# Patient Record
Sex: MALE | Race: WHITE | Employment: OTHER | URBAN - METROPOLITAN AREA
[De-identification: names, ages, dates, MRNs, and addresses within clinical notes are randomized per-mention and may not be internally consistent; named-entity substitution may affect disease eponyms.]

---

## 2017-05-25 ENCOUNTER — PREPPED CHART (OUTPATIENT)
Dept: URBAN - METROPOLITAN AREA CLINIC 35 | Facility: CLINIC | Age: 72
End: 2017-05-25

## 2017-09-18 NOTE — PROCEDURE NOTE: CLINICAL
PROCEDURE NOTE: Avastin () #1 OS. Diagnosis: Diabetes, Type II with Ocular Complications. Prep: Betadine Flush. Prior to the original injection, risks/benefits/alternatives discussed including infection, loss of vision, hemorrhage, cataract, glaucoma, retinal tears or detachment. A written consent is on file, and the need for today’s injection was discussed and the patient is understanding and wishes to proceed. The off-label status of Intravitreal Avastin also was reviewed. The patient wished to proceed with treatment. The patient wished to proceed with treatment. Topical anesthetic drops were applied to the eye. Betadine prep was performed. Surgical mask worn. Sterile drape and lid speculum were applied. Using the syringe provided, Avastin 1.25 mg in 0.05 cc was injected into the vitreous cavity. Injection site: 3-4 mm from the limbus. Patient tolerated procedure well. Following the intravitreal injection, the sterile lid speculum was removed. CRA perfusion confirmed. CF vision checked. Patient given office phone number/answering service number and advised to call immediately should there be an increase in floaters or redness, loss of vision or pain, or should they have any other questions or concerns. Riaz Brown

## 2017-10-16 NOTE — PATIENT DISCUSSION
Still some edema, rec Avastin #2 today.  this is a chronic disease and likely will require future tx's.

## 2017-10-16 NOTE — PATIENT DISCUSSION
Patient understands condition, prognosis and need for follow up care.  At this point not significantly active, can become active in future requiring additional tx.  S/S edu.

## 2017-12-18 NOTE — PATIENT DISCUSSION
IOP still high OU today.  rec cont Cosopt BUT use OU.  Add Latanoprost OHS OU.  pt okay to wait to get Latanoprost until Jan 1st due to being in medicare donut hole. STRESSED COMPLIANCE.

## 2018-01-22 NOTE — PATIENT DISCUSSION
Advised to call immediately if eye pain or loss of vision.
Discussed the importance of blood sugar control in the prevention of ocular complications.
Discussed the importance of blood sugar control in the prevention of ocular complications. Discussed this is a chronic disease.
Discussed with the patient the importance of good control of their blood sugar, blood pressure, cholesterol, diet, exercise, weight, and medication usage under the guidance of their diabetic doctor to prevent/halt diabetic retinopathy.
Doing well.
Edema seen on OCT and exam today. Series of 2 x 6 weeks apart.
No retinal detachment or retinal tear noted.
No tx rec at this time.
Patient understands condition, prognosis and need for follow up care.
Pt instilled last drop of Cosopt this a.m.  Gave samples of PF Cosopt to hold over for Dr. Jessy Kelly appt on 1/26/18.
Recommended observation.
Recommend  Avastin #3 (1 of 2) injection today.  Injection was administered without complication.  Post-injection instructions were reviewed and understood by the pt.
Retinal detachment warnings given.
See #1.
Use artificial tears to affected eye(s) as needed.
daily vision checks with amsler grid. continued AREDS2.
not active today.
no fever and no chills.

## 2018-01-22 NOTE — PROCEDURE NOTE: CLINICAL
PROCEDURE NOTE: Avastin () (1of 2) #3 OS. Diagnosis: Moderate Nonproliferative Diabetic Retinopathy. Prep: Betadine Drops and Betadine Scrub. Prior to the original injection, risks/benefits/alternatives discussed including infection, loss of vision, hemorrhage, cataract, glaucoma, retinal tears or detachment. A written consent is on file, and the need for today’s injection was discussed and the patient is understanding and wishes to proceed. The off-label status of Intravitreal Avastin also was reviewed. The patient wished to proceed with treatment. The patient wished to proceed with treatment. Topical anesthetic drops were applied to the eye. Betadine prep was performed. Surgical mask worn. Sterile drape and lid speculum were applied. Using the syringe provided, Avastin 1.25 mg in 0.05 cc was injected into the vitreous cavity. Injection site: 3-4 mm from the limbus. Patient tolerated procedure well. Following the intravitreal injection, the sterile lid speculum was removed. CRA perfusion confirmed. CF vision checked. Patient given office phone number/answering service number and advised to call immediately should there be an increase in floaters or redness, loss of vision or pain, or should they have any other questions or concerns. Isael Sylvester

## 2018-01-26 NOTE — PATIENT DISCUSSION
Discussed the importance of blood sugar control in the prevention of ocular complications. Discussed this is a chronic disease.

## 2018-01-26 NOTE — PATIENT DISCUSSION
Recommend  Avastin #3 (1 of 2) injection today.  Injection was administered without complication.  Post-injection instructions were reviewed and understood by the pt.

## 2018-03-12 NOTE — PROCEDURE NOTE: CLINICAL
PROCEDURE NOTE: Avastin () ( 2 Of 2) #4 OS. Diagnosis: Moderate Nonproliferative Diabetic Retinopathy. Prior to the original injection, risks/benefits/alternatives discussed including infection, loss of vision, hemorrhage, cataract, glaucoma, retinal tears or detachment. A written consent is on file, and the need for today’s injection was discussed and the patient is understanding and wishes to proceed. The off-label status of Intravitreal Avastin also was reviewed. The patient wished to proceed with treatment. The patient wished to proceed with treatment. Topical anesthetic drops were applied to the eye. Betadine prep was performed. Surgical mask worn. Sterile drape and lid speculum were applied. Using the syringe provided, Avastin 1.25 mg in 0.05 cc was injected into the vitreous cavity. Injection site: 3-4 mm from the limbus. Patient tolerated procedure well. Following the intravitreal injection, the sterile lid speculum was removed. CRA perfusion confirmed. CF vision checked. Patient given office phone number/answering service number and advised to call immediately should there be an increase in floaters or redness, loss of vision or pain, or should they have any other questions or concerns. Fabby Rene

## 2018-03-12 NOTE — PATIENT DISCUSSION
Pt here for Avastin #4 (2 of 2) injection today.  Injection was administered without complication.  Post-injection instructions were reviewed and understood by the pt.

## 2018-04-16 NOTE — PATIENT DISCUSSION
Pt has cost issues with current glc gtts.  Pt was given RX in the past by [facility] due to nationwide shortage from [employer] but advised all glc gtts should now go through [facility].  [address] team notified.

## 2018-04-16 NOTE — PROCEDURE NOTE: CLINICAL
PROCEDURE NOTE: Avastin () #5 OS. Diagnosis: Moderate Nonproliferative Diabetic Retinopathy. Prior to the original injection, risks/benefits/alternatives discussed including infection, loss of vision, hemorrhage, cataract, glaucoma, retinal tears or detachment. A written consent is on file, and the need for today’s injection was discussed and the patient is understanding and wishes to proceed. The off-label status of Intravitreal Avastin also was reviewed. The patient wished to proceed with treatment. The patient wished to proceed with treatment. Topical anesthetic drops were applied to the eye. Betadine prep was performed. Surgical mask worn. Sterile drape and lid speculum were applied. Using the syringe provided, Avastin 1.25 mg in 0.05 cc was injected into the vitreous cavity. Injection site: 3-4 mm from the limbus. Patient tolerated procedure well. Following the intravitreal injection, the sterile lid speculum was removed. CRA perfusion confirmed. CF vision checked. Patient given office phone number/answering service number and advised to call immediately should there be an increase in floaters or redness, loss of vision or pain, or should they have any other questions or concerns. Toi Paulino

## 2018-06-04 NOTE — PATIENT DISCUSSION
Pt edu no DME seen on exam or OCT today. Recommend watching for now. May need treatment in future if worsens. S/s of worsening discussed, advised to call with any vision changes.

## 2018-08-06 NOTE — PATIENT DISCUSSION
Pt edu edema worse and vision is worse.  Recommend treatment of Avastin today. Pt elects to proceed. Will follow up in 1 month and will possibly switch to Ozurdex.

## 2018-08-06 NOTE — PROCEDURE NOTE: CLINICAL
PROCEDURE NOTE: Avastin () #6 OS. Diagnosis: Moderate Nonproliferative Diabetic Retinopathy. Prior to the original injection, risks/benefits/alternatives discussed including infection, loss of vision, hemorrhage, cataract, glaucoma, retinal tears or detachment. A written consent is on file, and the need for today’s injection was discussed and the patient is understanding and wishes to proceed. The off-label status of Intravitreal Avastin also was reviewed. The patient wished to proceed with treatment. The patient wished to proceed with treatment. Topical anesthetic drops were applied to the eye. Betadine prep was performed. Surgical mask worn. Sterile drape and lid speculum were applied. Using the syringe provided, Avastin 1.25 mg in 0.05 cc was injected into the vitreous cavity. Injection site: 3-4 mm from the limbus. Patient tolerated procedure well. Following the intravitreal injection, the sterile lid speculum was removed. CRA perfusion confirmed. CF vision checked. Patient given office phone number/answering service number and advised to call immediately should there be an increase in floaters or redness, loss of vision or pain, or should they have any other questions or concerns. Rickey Bravo

## 2018-08-31 NOTE — PATIENT DISCUSSION
**Pt called when she got home, she has dorzolamide, not dorzolamide/timolol. Told pt to use Dorzolamide in OS BID, Timolol ou bid, and latanoprost ou qhs** 08/31/18 An.

## 2018-09-10 NOTE — PATIENT DISCUSSION
Pt edu edema is improving and vision is improving.  Recommend treatment of Avastin today. Pt elects to proceed.

## 2018-09-10 NOTE — PROCEDURE NOTE: CLINICAL
PROCEDURE NOTE: Avastin ()(1 of 2) #7 OS. Diagnosis: Moderate Nonproliferative Diabetic Retinopathy. Prior to the original injection, risks/benefits/alternatives discussed including infection, loss of vision, hemorrhage, cataract, glaucoma, retinal tears or detachment. A written consent is on file, and the need for today’s injection was discussed and the patient is understanding and wishes to proceed. The off-label status of Intravitreal Avastin also was reviewed. The patient wished to proceed with treatment. The patient wished to proceed with treatment. Topical anesthetic drops were applied to the eye. Betadine prep was performed. Surgical mask worn. Sterile drape and lid speculum were applied. Using the syringe provided, Avastin 1.25 mg in 0.05 cc was injected into the vitreous cavity. Injection site: 3-4 mm from the limbus. Patient tolerated procedure well. Following the intravitreal injection, the sterile lid speculum was removed. CRA perfusion confirmed. CF vision checked. Patient given office phone number/answering service number and advised to call immediately should there be an increase in floaters or redness, loss of vision or pain, or should they have any other questions or concerns. Rhonda Luis

## 2018-09-10 NOTE — PATIENT DISCUSSION
Injection of Avastin # 7 (1 of 2) given today without complication.  Post-injection instructions were reviewed and understood by pt.  Series of 2 x 5 weeks apart.

## 2018-09-10 NOTE — PATIENT DISCUSSION
AMD remains persistent but without progression. Continue with Amsler grid and AREDS 2. Avoid direct or indirect smoking.

## 2018-10-15 NOTE — PROCEDURE NOTE: CLINICAL
PROCEDURE NOTE: Avastin () (2 of 2) #8 OS. Diagnosis: Moderate Nonproliferative Diabetic Retinopathy. Prior to the original injection, risks/benefits/alternatives discussed including infection, loss of vision, hemorrhage, cataract, glaucoma, retinal tears or detachment. A written consent is on file, and the need for today’s injection was discussed and the patient is understanding and wishes to proceed. The off-label status of Intravitreal Avastin also was reviewed. The patient wished to proceed with treatment. The patient wished to proceed with treatment. Topical anesthetic drops were applied to the eye. Betadine prep was performed. Surgical mask worn. Sterile drape and lid speculum were applied. Using the syringe provided, Avastin 1.25 mg in 0.05 cc was injected into the vitreous cavity. Injection site: 3-4 mm from the limbus. Patient tolerated procedure well. Following the intravitreal injection, the sterile lid speculum was removed. CRA perfusion confirmed. CF vision checked. Patient given office phone number/answering service number and advised to call immediately should there be an increase in floaters or redness, loss of vision or pain, or should they have any other questions or concerns. Kate Arauz

## 2018-10-15 NOTE — PATIENT DISCUSSION
Injection of Avastin # 8 (2 of 2) given today without complication.  Post-injection instructions were reviewed and understood by pt.

## 2018-11-26 NOTE — PROCEDURE NOTE: CLINICAL
PROCEDURE NOTE: Avastin ()(1 of 2) #9 OS. Diagnosis: Moderate Nonproliferative Diabetic Retinopathy. Prior to the original injection, risks/benefits/alternatives discussed including infection, loss of vision, hemorrhage, cataract, glaucoma, retinal tears or detachment. A written consent is on file, and the need for today’s injection was discussed and the patient is understanding and wishes to proceed. The off-label status of Intravitreal Avastin also was reviewed. The patient wished to proceed with treatment. The patient wished to proceed with treatment. Topical anesthetic drops were applied to the eye. Betadine prep was performed. Surgical mask worn. Sterile drape and lid speculum were applied. Using the syringe provided, Avastin 1.25 mg in 0.05 cc was injected into the vitreous cavity. Injection site: 3-4 mm from the limbus. Patient tolerated procedure well. Following the intravitreal injection, the sterile lid speculum was removed. CRA perfusion confirmed. CF vision checked. Patient given office phone number/answering service number and advised to call immediately should there be an increase in floaters or redness, loss of vision or pain, or should they have any other questions or concerns. Sandy Gamez

## 2019-01-07 NOTE — PATIENT DISCUSSION
Avastin # 10 (2 of 2) given without complication. Post injection instructions given to patient. Symptoms of retinal detachment and endophthalmitis following intravitreal injection discussed; patient advised to call immediately if symptoms ensue.

## 2019-01-07 NOTE — PROCEDURE NOTE: CLINICAL
PROCEDURE NOTE: Avastin () ( 2 of 2) #10 OS. Diagnosis: Moderate Nonproliferative Diabetic Retinopathy. Prep: Betadine Drops and Betadine Scrub. Prior to the original injection, risks/benefits/alternatives discussed including infection, loss of vision, hemorrhage, cataract, glaucoma, retinal tears or detachment. A written consent is on file, and the need for today’s injection was discussed and the patient is understanding and wishes to proceed. The off-label status of Intravitreal Avastin also was reviewed. The patient wished to proceed with treatment. The patient wished to proceed with treatment. Topical anesthetic drops were applied to the eye. Betadine prep was performed. Surgical mask worn. Sterile drape and lid speculum were applied. Using the syringe provided, Avastin 1.25 mg in 0.05 cc was injected into the vitreous cavity. Injection site: 3-4 mm from the limbus. Patient tolerated procedure well. Following the intravitreal injection, the sterile lid speculum was removed. CRA perfusion confirmed. CF vision checked. Patient given office phone number/answering service number and advised to call immediately should there be an increase in floaters or redness, loss of vision or pain, or should they have any other questions or concerns. Deidra Anderson

## 2019-03-04 NOTE — PROCEDURE NOTE: CLINICAL
PROCEDURE NOTE: Eylea 2mg (1 of 2) #1 OS. Diagnosis: Diabetic Macular Edema. Prior to injection, risks/benefits/alternatives discussed including corneal abrasion, infection, loss of vision, hemorrhage, cataract, glaucoma, retinal tears or detachment. A written consent is on file, and the need for today’s injection was discussed and the patient is understanding and wishes to proceed. The entire vial of Eylea was drawn up into a syringe. The opened vial, remaining drug, and filtered needle were disposed of in a certified biohazard container. Betadine prep was performed. Topical anesthesia was induced with Alcaine. 4% lidocaine pledge. A lid speculum was used. A short 30g needle on a 1cc syringe was used with product that that had previously been prepared under sterile conditions. Injection site: 3-4 mm from the limbus. The used syringe/needle was transferred to a biohazard container. Lid speculum removed. Mask worn during procedure. Patient tolerated procedure well. Count fingers vision was verified. There were no complications. Patient was given the standard instruction sheet. Patient given office phone number/answering service number and advised to call immediately should there be loss of vision or pain, or should they have any other questions or concerns. Haven Newell

## 2019-03-04 NOTE — PATIENT DISCUSSION
Pt edu DME worse. Very long detailed discussion with patient regarding [facility] [employer] of Eylea. Pt states that she read in some magazine that Eylea was bad for Diabetics and risk of infection. Stressed to patient there are risks of infection with ALL injections. Recommend a series of 2 Eylea, 4 weeks apart. Pt elects to proceed.

## 2019-04-01 NOTE — PROCEDURE NOTE: CLINICAL
PROCEDURE NOTE: Eylea 2mg ( 2 of 2) #2 OS. Diagnosis: Moderate Nonproliferative Diabetic Retinopathy. Prior to injection, risks/benefits/alternatives discussed including corneal abrasion, infection, loss of vision, hemorrhage, cataract, glaucoma, retinal tears or detachment. A written consent is on file, and the need for today’s injection was discussed and the patient is understanding and wishes to proceed. The entire vial of Eylea was drawn up into a syringe. The opened vial, remaining drug, and filtered needle were disposed of in a certified biohazard container. Betadine prep was performed. Topical anesthesia was induced with Alcaine. 4% lidocaine pledge. A lid speculum was used. A short 30g needle on a 1cc syringe was used with product that that had previously been prepared under sterile conditions. Injection site: 3-4 mm from the limbus. The used syringe/needle was transferred to a biohazard container. Lid speculum removed. Mask worn during procedure. Patient tolerated procedure well. Count fingers vision was verified. There were no complications. Patient was given the standard instruction sheet. Patient given office phone number/answering service number and advised to call immediately should there be loss of vision or pain, or should they have any other questions or concerns. Pepe Wiseman

## 2019-04-22 ASSESSMENT — TONOMETRY
OD_IOP_MMHG: 13
OS_IOP_MMHG: 14

## 2019-04-22 ASSESSMENT — VISUAL ACUITY
OD_CC: J1
OS_CC: J1+
OS_SC: J10
OD_CC: 20/20-2
OS_SC: 20/70-1
OD_SC: J10
OD_SC: 20/80
OS_CC: 20/20-2

## 2019-04-23 ENCOUNTER — ESTABLISHED COMPREHENSIVE EXAM (OUTPATIENT)
Dept: URBAN - METROPOLITAN AREA CLINIC 35 | Facility: CLINIC | Age: 74
End: 2019-04-23

## 2019-04-23 DIAGNOSIS — H52.7: ICD-10-CM

## 2019-04-23 DIAGNOSIS — H25.9: ICD-10-CM

## 2019-04-23 DIAGNOSIS — H35.363: ICD-10-CM

## 2019-04-23 PROCEDURE — 92015 DETERMINE REFRACTIVE STATE: CPT

## 2019-04-23 PROCEDURE — 92014 COMPRE OPH EXAM EST PT 1/>: CPT

## 2019-04-23 PROCEDURE — 92134 CPTRZ OPH DX IMG PST SGM RTA: CPT

## 2019-04-23 ASSESSMENT — VISUAL ACUITY
OS_SC: 20/70
OD_BAT: 20/30
OD_CC: 20/30-1
OD_SC: 20/100-
OS_CC: 20/25-2
OS_BAT: 20/30
OS_CC: J1
OD_CC: J1

## 2019-04-23 ASSESSMENT — TONOMETRY
OD_IOP_MMHG: 14
OS_IOP_MMHG: 14

## 2019-04-29 NOTE — PROCEDURE NOTE: CLINICAL
PROCEDURE NOTE: Eylea 2mg (1 of 2) #3 OS. Diagnosis: Moderate Nonproliferative Diabetic Retinopathy. Prior to injection, risks/benefits/alternatives discussed including corneal abrasion, infection, loss of vision, hemorrhage, cataract, glaucoma, retinal tears or detachment. A written consent is on file, and the need for today’s injection was discussed and the patient is understanding and wishes to proceed. The entire vial of Eylea was drawn up into a syringe. The opened vial, remaining drug, and filtered needle were disposed of in a certified biohazard container. Betadine prep was performed. Topical anesthesia was induced with Alcaine. 4% lidocaine pledge. A lid speculum was used. A short 30g needle on a 1cc syringe was used with product that that had previously been prepared under sterile conditions. Injection site: 3-4 mm from the limbus. The used syringe/needle was transferred to a biohazard container. Lid speculum removed. Mask worn during procedure. Patient tolerated procedure well. Count fingers vision was verified. There were no complications. Patient was given the standard instruction sheet. Patient given office phone number/answering service number and advised to call immediately should there be loss of vision or pain, or should they have any other questions or concerns. Andreas Bojorquez

## 2019-06-10 NOTE — PATIENT DISCUSSION
Pt here today for Eylea #4(2 of 2) injection.  Injection was given without complication, post injection instructions given.

## 2019-06-10 NOTE — PROCEDURE NOTE: CLINICAL
PROCEDURE NOTE: Eylea 2mg (2 of 2) #4 OS. Diagnosis: Moderate Nonproliferative Diabetic Retinopathy. Prior to injection, risks/benefits/alternatives discussed including corneal abrasion, infection, loss of vision, hemorrhage, cataract, glaucoma, retinal tears or detachment. A written consent is on file, and the need for today’s injection was discussed and the patient is understanding and wishes to proceed. The entire vial of Eylea was drawn up into a syringe. The opened vial, remaining drug, and filtered needle were disposed of in a certified biohazard container. Betadine prep was performed. Topical anesthesia was induced with Alcaine. 4% lidocaine pledge. A lid speculum was used. A short 30g needle on a 1cc syringe was used with product that that had previously been prepared under sterile conditions. Injection site: 3-4 mm from the limbus. The used syringe/needle was transferred to a biohazard container. Lid speculum removed. Mask worn during procedure. Patient tolerated procedure well. Count fingers vision was verified. There were no complications. Patient was given the standard instruction sheet. Patient given office phone number/answering service number and advised to call immediately should there be loss of vision or pain, or should they have any other questions or concerns. Lot #[id number], exp 2/28/20.

## 2019-07-08 NOTE — PATIENT DISCUSSION
Good response to tx, no new fluid or blood seen today.  Rec series of 2 Eylea x 4-5 weeks apart.  Goal of tx edu, Rec Eylea #5(1 of 2) injection today.  Injection was given without complication, post injection instructions given.

## 2019-07-08 NOTE — PROCEDURE NOTE: CLINICAL
PROCEDURE NOTE: Eylea 2mg (1 of 2) #5 OS. Diagnosis: Moderate Nonproliferative Diabetic Retinopathy. Prior to injection, risks/benefits/alternatives discussed including corneal abrasion, infection, loss of vision, hemorrhage, cataract, glaucoma, retinal tears or detachment. A written consent is on file, and the need for today’s injection was discussed and the patient is understanding and wishes to proceed. The entire vial of Eylea was drawn up into a syringe. The opened vial, remaining drug, and filtered needle were disposed of in a certified biohazard container. Betadine prep was performed. Topical anesthesia was induced with Alcaine. 4% lidocaine pledge. A lid speculum was used. A short 30g needle on a 1cc syringe was used with product that that had previously been prepared under sterile conditions. Injection site: 3-4 mm from the limbus. The used syringe/needle was transferred to a biohazard container. Lid speculum removed. Mask worn during procedure. Patient tolerated procedure well. Count fingers vision was verified. There were no complications. Patient was given the standard instruction sheet. Patient given office phone number/answering service number and advised to call immediately should there be loss of vision or pain, or should they have any other questions or concerns. [facility]#[id number] exp 11/2019.

## 2019-08-05 NOTE — PROCEDURE NOTE: CLINICAL
PROCEDURE NOTE: Eylea 2mg (2 of 2) #6 OS. Diagnosis: Moderate Nonproliferative Diabetic Retinopathy. Prior to injection, risks/benefits/alternatives discussed including corneal abrasion, infection, loss of vision, hemorrhage, cataract, glaucoma, retinal tears or detachment. A written consent is on file, and the need for today’s injection was discussed and the patient is understanding and wishes to proceed. The entire vial of Eylea was drawn up into a syringe. The opened vial, remaining drug, and filtered needle were disposed of in a certified biohazard container. Betadine prep was performed. Topical anesthesia was induced with Alcaine. 4% lidocaine pledge. A lid speculum was used. A short 30g needle on a 1cc syringe was used with product that that had previously been prepared under sterile conditions. Injection site: 3-4 mm from the limbus. The used syringe/needle was transferred to a biohazard container. Lid speculum removed. Mask worn during procedure. Patient tolerated procedure well. Count fingers vision was verified. There were no complications. Patient was given the standard instruction sheet. Patient given office phone number/answering service number and advised to call immediately should there be loss of vision or pain, or should they have any other questions or concerns. [id number] exp 01/2020.

## 2019-08-05 NOTE — PATIENT DISCUSSION
Pt here for Eylea #6(2 of 2) injection today.  Injection was given without complication, post injection instructions given.

## 2019-08-05 NOTE — PATIENT DISCUSSION
Pt edu Visual acuity today is below legal driving limits, per pt she is picking up new glasses that she is legal to drive in.  Stressed importance and follow with Dr. Mckenzie Dumont for any DMV forms.

## 2019-08-15 NOTE — PATIENT DISCUSSION
Pt edu Visual acuity today is below legal driving limits, per pt she is picking up new glasses that she is legal to drive in.  Stressed importance and follow with Dr. Diane Boykin for any DMV forms.

## 2019-09-06 NOTE — PATIENT DISCUSSION
Pt edu Visual acuity today is below legal driving limits, per pt she is picking up new glasses that she is legal to drive in.  Stressed importance and follow with Dr. Flakita Stein for any DMV forms.

## 2019-09-30 NOTE — PATIENT DISCUSSION
Edema seen on OCT and exam today.  Recommended Eylea #7 injection today. The injection was administered without complication.  Post-injection instructions were reviewed and understood by pt.

## 2019-09-30 NOTE — PROCEDURE NOTE: CLINICAL
PROCEDURE NOTE: Eylea 2mg #7 OS. Diagnosis: Moderate Nonproliferative Diabetic Retinopathy. Prior to injection, risks/benefits/alternatives discussed including corneal abrasion, infection, loss of vision, hemorrhage, cataract, glaucoma, retinal tears or detachment. A written consent is on file, and the need for today’s injection was discussed and the patient is understanding and wishes to proceed. The entire vial of Eylea was drawn up into a syringe. The opened vial, remaining drug, and filtered needle were disposed of in a certified biohazard container. Betadine prep was performed. Topical anesthesia was induced with Alcaine. 4% lidocaine pledge. A lid speculum was used. A short 30g needle on a 1cc syringe was used with product that that had previously been prepared under sterile conditions. Injection site: 3-4 mm from the limbus. The used syringe/needle was transferred to a biohazard container. Lid speculum removed. Mask worn during procedure. Patient tolerated procedure well. Count fingers vision was verified. There were no complications. Patient was given the standard instruction sheet. Patient given office phone number/answering service number and advised to call immediately should there be loss of vision or pain, or should they have any other questions or concerns. Lot #[id number], exp 7/31/20.

## 2019-11-04 NOTE — PATIENT DISCUSSION
Pt edu no edema seen on OCT or exam today.  Discussed due to increase vision from last visit, recommended keep treatments at 4 weeks.  Recommended Eylea #8 (1 of 2) injection today.  Series of 2 x 4 weeks apart. The injection was administered without complication.  Post-injection instructions were reviewed and understood by pt.

## 2019-11-04 NOTE — PROCEDURE NOTE: CLINICAL
PROCEDURE NOTE: Eylea 2mg (1 of 2) #8 OS. Diagnosis: Moderate Nonproliferative Diabetic Retinopathy. Prep: Betadine Drops and Betadine Scrub. Prior to injection, risks/benefits/alternatives discussed including corneal abrasion, infection, loss of vision, hemorrhage, cataract, glaucoma, retinal tears or detachment. A written consent is on file, and the need for today’s injection was discussed and the patient is understanding and wishes to proceed. The entire vial of Eylea was drawn up into a syringe. The opened vial, remaining drug, and filtered needle were disposed of in a certified biohazard container. Betadine prep was performed. Topical anesthesia was induced with Alcaine. 4% lidocaine pledge. A lid speculum was used. A short 30g needle on a 1cc syringe was used with product that that had previously been prepared under sterile conditions. Injection site: 3-4 mm from the limbus. The used syringe/needle was transferred to a biohazard container. Lid speculum removed. Mask worn during procedure. Patient tolerated procedure well. Count fingers vision was verified. There were no complications. Patient was given the standard instruction sheet. Patient given office phone number/answering service number and advised to call immediately should there be loss of vision or pain, or should they have any other questions or concerns. Lot # [id number], exp 9/30/20.

## 2019-12-09 NOTE — PROCEDURE NOTE: CLINICAL
PROCEDURE NOTE: Eylea 2mg (2 of 2) #9 OS. Diagnosis: Moderate Nonproliferative Diabetic Retinopathy. Prep: Betadine Drops and Betadine Scrub. Prior to injection, risks/benefits/alternatives discussed including corneal abrasion, infection, loss of vision, hemorrhage, cataract, glaucoma, retinal tears or detachment. A written consent is on file, and the need for today’s injection was discussed and the patient is understanding and wishes to proceed. The entire vial of Eylea was drawn up into a syringe. The opened vial, remaining drug, and filtered needle were disposed of in a certified biohazard container. Betadine prep was performed. Topical anesthesia was induced with Alcaine. 4% lidocaine pledge. A lid speculum was used. A short 30g needle on a 1cc syringe was used with product that that had previously been prepared under sterile conditions. Injection site: 3-4 mm from the limbus. The used syringe/needle was transferred to a biohazard container. Lid speculum removed. Mask worn during procedure. Patient tolerated procedure well. Count fingers vision was verified. There were no complications. Patient was given the standard instruction sheet. Patient given office phone number/answering service number and advised to call immediately should there be loss of vision or pain, or should they have any other questions or concerns. Lot # [id number], exp 10/31/20.

## 2019-12-09 NOTE — PATIENT DISCUSSION
Pt here today for Eylea #9 (2 of 2) injection today.  The injection was administered without complication.  Post-injection instructions were reviewed and understood by pt.

## 2020-01-13 NOTE — PROCEDURE NOTE: CLINICAL
PROCEDURE NOTE: Eylea 2mg(1 of 2) #10 OS. Diagnosis: Moderate Nonproliferative Diabetic Retinopathy. Prep: Betadine Drops and Betadine Scrub. Prior to injection, risks/benefits/alternatives discussed including corneal abrasion, infection, loss of vision, hemorrhage, cataract, glaucoma, retinal tears or detachment. A written consent is on file, and the need for today’s injection was discussed and the patient is understanding and wishes to proceed. The entire vial of Eylea was drawn up into a syringe. The opened vial, remaining drug, and filtered needle were disposed of in a certified biohazard container. Betadine prep was performed. Topical anesthesia was induced with Alcaine. 4% lidocaine pledge. A lid speculum was used. A short 30g needle on a 1cc syringe was used with product that that had previously been prepared under sterile conditions. Injection site: 3-4 mm from the limbus. The used syringe/needle was transferred to a biohazard container. Lid speculum removed. Mask worn during procedure. Patient tolerated procedure well. Count fingers vision was verified. There were no complications. Patient was given the standard instruction sheet. Patient given office phone number/answering service number and advised to call immediately should there be loss of vision or pain, or should they have any other questions or concerns. Dameon Ring

## 2020-01-13 NOTE — PATIENT DISCUSSION
Trace edema seen today and active.  Rec cont with Eylea, rec series of 2 x 5 weeks apart. Eylea #10 (2 of 2) injection today.  The injection was administered without complication.  Post-injection instructions were reviewed and understood by pt.

## 2020-02-17 NOTE — PATIENT DISCUSSION
Pt here for Eylea #11 (2 of 2) injection today.  The injection was administered without complication.  Post-injection instructions were reviewed and understood by pt.

## 2020-04-13 NOTE — PROCEDURE NOTE: CLINICAL
PROCEDURE NOTE: Eylea Sample(1 of 2) #12 OS. Diagnosis: Moderate Nonproliferative Diabetic Retinopathy. Anesthesia: Topical. Prep: Betadine Drops and Betadine Scrub. Prior to injection, risks/benefits/alternatives discussed including corneal abrasion, infection, loss of vision, hemorrhage, cataract, glaucoma, retinal tears or detachment. A written consent is on file, and the need for today’s injection was discussed and the patient is understanding and wishes to proceed. The entire vial of Eylea was drawn up into a syringe. The opened vial, remaining drug, and filtered needle were disposed of in a certified biohazard container. Betadine prep was performed. Topical anesthesia was induced with Alcaine. 4% lidocaine pledge. A lid speculum was used. A short 30g needle on a 1cc syringe was used with product that that had previously been prepared under sterile conditions. Injection site: 3-4 mm from the limbus. The used syringe/needle was transferred to a biohazard container. Lid speculum removed. Mask worn during procedure. Patient tolerated procedure well. Count fingers vision was verified. There were no complications. Patient was given the standard instruction sheet. Patient given office phone number/answering service number and advised to call immediately should there be loss of vision or pain, or should they have any other questions or concerns. Effie Quan

## 2020-04-13 NOTE — PATIENT DISCUSSION
Still active, rec series of 2 Eylea x 4 weeks apart.  Pt is in a Skilled nursing facility and facility will not authorize payment of Eylea so sample med will be used today. Pt states should be out in a few weeks.   Eylea SAMPLE #12 (1 of 2) injection today.  The injection was administered without complication.  Post-injection instructions were reviewed and understood by pt.

## 2020-05-11 NOTE — PROCEDURE NOTE: CLINICAL
PROCEDURE NOTE: Eylea Prefilled Syringe 2mg (2 of 2) #13 OS. Diagnosis: Moderate Nonproliferative Diabetic Retinopathy. Prep: Betadine Drops and Betadine Scrub. Prior to injection, risks/benefits/alternatives discussed including corneal abrasion, infection, loss of vision, hemorrhage, cataract, glaucoma, retinal tears or detachment. A written consent is on file, and the need for today's injection was discussed and the patient is understanding and wishes to proceed. A 30G needle was placed on an Eylea 2mg/0.05ml Pre-filled Syringe. Betadine prep was performed. Topical anesthesia was induced with Alcaine. 4% lidocaine pledge. A lid speculum was used. An intravitreal injection of Eylea was given. Injection site: 3-4 mm from the limbus. The used syringe/needle was transferred to a biohazard container. Lid speculum removed. Mask worn during procedure. Patient tolerated procedure well. Count fingers vision was verified. There were no complications. Patient was given the standard instruction sheet. Patti Giron

## 2020-06-08 NOTE — PROCEDURE NOTE: CLINICAL
PROCEDURE NOTE: Eylea Prefilled Syringe 2mg (1 of 2) #14 OS. Diagnosis: Diabetic Macular Edema. Prep: Betadine Drops and Betadine Scrub. Prior to injection, risks/benefits/alternatives discussed including corneal abrasion, infection, loss of vision, hemorrhage, cataract, glaucoma, retinal tears or detachment. A written consent is on file, and the need for today's injection was discussed and the patient is understanding and wishes to proceed. A 30G needle was placed on an Eylea 2mg/0.05ml Pre-filled Syringe. Betadine prep was performed. Topical anesthesia was induced with Alcaine. 4% lidocaine pledge. A lid speculum was used. An intravitreal injection of Eylea was given. Injection site: 3-4 mm from the limbus. The used syringe/needle was transferred to a biohazard container. Lid speculum removed. Mask worn during procedure. Patient tolerated procedure well. Count fingers vision was verified. There were no complications. Patient was given the standard instruction sheet. Shante Reyna

## 2020-06-08 NOTE — PATIENT DISCUSSION
Pt edu improved from last exam. Due to better seeing eye would recommend a series of 2 Eylea, 5 weeks apart. Pt elects to proceed.

## 2020-06-08 NOTE — PATIENT DISCUSSION
No DME seen on exam. No treatment recommended at this time, no increase in [place] potential due to damage from advanced Glc and DRCheryle

## 2020-07-13 NOTE — PROCEDURE NOTE: CLINICAL
PROCEDURE NOTE: Eylea Prefilled Syringe 2mg (2 of 2) #15 OS. Diagnosis: Moderate Nonproliferative Diabetic Retinopathy. Prep: Betadine Drops and Betadine Scrub. Prior to injection, risks/benefits/alternatives discussed including corneal abrasion, infection, loss of vision, hemorrhage, cataract, glaucoma, retinal tears or detachment. A written consent is on file, and the need for today's injection was discussed and the patient is understanding and wishes to proceed. A 30G needle was placed on an Eylea 2mg/0.05ml Pre-filled Syringe. Betadine prep was performed. Topical anesthesia was induced with Alcaine. 4% lidocaine pledge. A lid speculum was used. An intravitreal injection of Eylea was given. Injection site: 3-4 mm from the limbus. The used syringe/needle was transferred to a biohazard container. Lid speculum removed. Mask worn during procedure. Patient tolerated procedure well. Count fingers vision was verified. There were no complications. Patient was given the standard instruction sheet. lot #[id number], exp 10/31/2020.

## 2020-07-13 NOTE — PATIENT DISCUSSION
Pt here today for Eylea #15 (2 of 2) injection.  The injection was administered without complication.  Post-injection instructions were reviewed and understood by pt.

## 2020-08-31 NOTE — PATIENT DISCUSSION
DME seen today. No treatment recommended at this time, no increase in [place] potential due to damage from advanced Glc and DRCheryle

## 2020-08-31 NOTE — PROCEDURE NOTE: CLINICAL
PROCEDURE NOTE: Eylea Prefilled Syringe 2mg(1 of 2) #16 OS. Diagnosis: Moderate Nonproliferative Diabetic Retinopathy. Prior to injection, risks/benefits/alternatives discussed including corneal abrasion, infection, loss of vision, hemorrhage, cataract, glaucoma, retinal tears or detachment. A written consent is on file, and the need for today's injection was discussed and the patient is understanding and wishes to proceed. A 30G needle was placed on an Eylea 2mg/0.05ml Pre-filled Syringe. Betadine prep was performed. Topical anesthesia was induced with Alcaine. 4% lidocaine pledge. A lid speculum was used. An intravitreal injection of Eylea was given. Injection site: 3-4 mm from the limbus. The used syringe/needle was transferred to a biohazard container. Lid speculum removed. Mask worn during procedure. Patient tolerated procedure well. Count fingers vision was verified. There were no complications. Patient was given the standard instruction sheet. Pennye Hand

## 2020-08-31 NOTE — PATIENT DISCUSSION
Worse today and rec tighten intervals to Q 5 weeks.  Series of 2 Eylea x 5 weeks apart rec.  Eylea #16 (1 of 2) injection today.  The injection was administered without complication.  Post-injection instructions were reviewed and understood by pt.

## 2020-10-05 NOTE — PATIENT DISCUSSION
DME seen today. No treatment recommended at this time, no increase in [address] potential due to damage from advanced Glc and

## 2020-10-05 NOTE — PROCEDURE NOTE: CLINICAL
PROCEDURE NOTE: Eylea Prefilled Syringe 2mg (2 of 2) #17 OS. Diagnosis: Diabetic Macular Edema. Prep: Betadine Drops and Betadine Scrub. Prior to injection, risks/benefits/alternatives discussed including corneal abrasion, infection, loss of vision, hemorrhage, cataract, glaucoma, retinal tears or detachment. A written consent is on file, and the need for today's injection was discussed and the patient is understanding and wishes to proceed. A 30G needle was placed on an Eylea 2mg/0.05ml Pre-filled Syringe. Betadine prep was performed. Topical anesthesia was induced with Alcaine. 4% lidocaine pledge. A lid speculum was used. An intravitreal injection of Eylea was given. Injection site: 3-4 mm from the limbus. The used syringe/needle was transferred to a biohazard container. Lid speculum removed. Mask worn during procedure. Patient tolerated procedure well. Count fingers vision was verified. There were no complications. Patient was given the standard instruction sheet. Patti Giron

## 2020-11-09 NOTE — PATIENT DISCUSSION
Pt edu improving edema on OCT and exam today.  Recommended Eylea #18 injection today.   Will continue at 5 week intervals. Pt elects to proceed.  The injection was administered without complication.  Post-injection instructions were reviewed and understood by pt.  Series of 2 x 5 weeks apart.

## 2020-11-09 NOTE — PROCEDURE NOTE: CLINICAL
PROCEDURE NOTE: Eylea 2mg (1 of 2) #18 OS. Diagnosis: Moderate Nonproliferative Diabetic Retinopathy. Prep: Betadine Drops and Betadine Scrub. Prior to injection, risks/benefits/alternatives discussed including corneal abrasion, infection, loss of vision, hemorrhage, cataract, glaucoma, retinal tears or detachment. A written consent is on file, and the need for today’s injection was discussed and the patient is understanding and wishes to proceed. The entire vial of Eylea was drawn up into a syringe. The opened vial, remaining drug, and filtered needle were disposed of in a certified biohazard container. Betadine prep was performed. Topical anesthesia was induced with Alcaine. 4% lidocaine pledge. A lid speculum was used. A short 30g needle on a 1cc syringe was used with product that that had previously been prepared under sterile conditions. Injection site: 3-4 mm from the limbus. The used syringe/needle was transferred to a biohazard container. Lid speculum removed. Mask worn during procedure. Patient tolerated procedure well. Count fingers vision was verified. There were no complications. Patient was given the standard instruction sheet. Patient given office phone number/answering service number and advised to call immediately should there be loss of vision or pain, or should they have any other questions or concerns. lot #[id number], exp 6/30/2021.

## 2020-12-14 NOTE — PROCEDURE NOTE: CLINICAL
PROCEDURE NOTE: Eylea 2mg (2 of 2) #19 OS. Diagnosis: Diabetic Macular Edema. Prep: Betadine Drops and Betadine Scrub. Prior to injection, risks/benefits/alternatives discussed including corneal abrasion, infection, loss of vision, hemorrhage, cataract, glaucoma, retinal tears or detachment. A written consent is on file, and the need for today’s injection was discussed and the patient is understanding and wishes to proceed. The entire vial of Eylea was drawn up into a syringe. The opened vial, remaining drug, and filtered needle were disposed of in a certified biohazard container. Betadine prep was performed. Topical anesthesia was induced with Alcaine. 4% lidocaine pledge. A lid speculum was used. A short 30g needle on a 1cc syringe was used with product that that had previously been prepared under sterile conditions. Injection site: 3-4 mm from the limbus. The used syringe/needle was transferred to a biohazard container. Lid speculum removed. Mask worn during procedure. Patient tolerated procedure well. Count fingers vision was verified. There were no complications. Patient was given the standard instruction sheet. Patient given office phone number/answering service number and advised to call immediately should there be loss of vision or pain, or should they have any other questions or concerns. Thao Turner

## 2020-12-14 NOTE — PATIENT DISCUSSION
Lanre #19 (2 of 2) today given without complication. Post injection instructions given and understood by patient.

## 2021-01-18 NOTE — PATIENT DISCUSSION
No fluid or edema today on OCT or exam.  Will continue treatment intervals q5 weeks.  Recommended Eylea #20 injection today.  Pt elects to proceed.  The injection was given without complication. Post injection instructions given and understood by patient.  Series of 2 x 5 weeks apart.

## 2021-01-18 NOTE — PROCEDURE NOTE: CLINICAL
PROCEDURE NOTE: Eylea 2mg(1 of 2) #20 OS. Diagnosis: Moderate Nonproliferative Diabetic Retinopathy. Prep: Betadine Drops and Betadine Scrub. Prior to injection, risks/benefits/alternatives discussed including corneal abrasion, infection, loss of vision, hemorrhage, cataract, glaucoma, retinal tears or detachment. A written consent is on file, and the need for today’s injection was discussed and the patient is understanding and wishes to proceed. The entire vial of Eylea was drawn up into a syringe. The opened vial, remaining drug, and filtered needle were disposed of in a certified biohazard container. Betadine prep was performed. Topical anesthesia was induced with Alcaine. 4% lidocaine pledge. A lid speculum was used. A short 30g needle on a 1cc syringe was used with product that that had previously been prepared under sterile conditions. Injection site: 3-4 mm from the limbus. The used syringe/needle was transferred to a biohazard container. Lid speculum removed. Mask worn during procedure. Patient tolerated procedure well. Count fingers vision was verified. There were no complications. Patient was given the standard instruction sheet. Patient given office phone number/answering service number and advised to call immediately should there be loss of vision or pain, or should they have any other questions or concerns. lot #[id number], exp 6/30/21.

## 2021-02-12 NOTE — PATIENT DISCUSSION
The absence of macula edema findings were communicated to provider. What Type Of Note Output Would You Prefer (Optional)?: Standard Output How Severe Is Your Acne?: mild Is This A New Presentation, Or A Follow-Up?: Acne

## 2021-02-22 NOTE — PATIENT DISCUSSION
Recommended Eylea #21 (2 of 2) injection today.  Pt elects to proceed.  The injection was given without complication. Post injection instructions given and understood by patient.

## 2021-02-22 NOTE — PROCEDURE NOTE: CLINICAL
PROCEDURE NOTE: Eylea 2mg (2 of 2) #21 OS. Diagnosis: Diabetic Macular Edema. Prep: Betadine Drops and Betadine Scrub. Prior to injection, risks/benefits/alternatives discussed including corneal abrasion, infection, loss of vision, hemorrhage, cataract, glaucoma, retinal tears or detachment. A written consent is on file, and the need for today’s injection was discussed and the patient is understanding and wishes to proceed. The entire vial of Eylea was drawn up into a syringe. The opened vial, remaining drug, and filtered needle were disposed of in a certified biohazard container. Betadine prep was performed. Topical anesthesia was induced with Alcaine. 4% lidocaine pledge. A lid speculum was used. A short 30g needle on a 1cc syringe was used with product that that had previously been prepared under sterile conditions. Injection site: 3-4 mm from the limbus. The used syringe/needle was transferred to a biohazard container. Lid speculum removed. Mask worn during procedure. Patient tolerated procedure well. Count fingers vision was verified. There were no complications. Patient was given the standard instruction sheet. Patient given office phone number/answering service number and advised to call immediately should there be loss of vision or pain, or should they have any other questions or concerns. Kate Arauz

## 2021-02-22 NOTE — PATIENT DISCUSSION
DME seen today. No treatment recommended at this time, no increase in [facility] potential due to damage from advanced Glc and

## 2021-03-29 NOTE — PROCEDURE NOTE: CLINICAL
PROCEDURE NOTE: Eylea 2mg(1 of 2) #22 OS. Diagnosis: Moderate Nonproliferative Diabetic Retinopathy. Prep: Betadine Drops and Betadine Scrub. Prior to injection, risks/benefits/alternatives discussed including corneal abrasion, infection, loss of vision, hemorrhage, cataract, glaucoma, retinal tears or detachment. A written consent is on file, and the need for today’s injection was discussed and the patient is understanding and wishes to proceed. The entire vial of Eylea was drawn up into a syringe. The opened vial, remaining drug, and filtered needle were disposed of in a certified biohazard container. Betadine prep was performed. Topical anesthesia was induced with Alcaine. 4% lidocaine pledge. A lid speculum was used. A short 30g needle on a 1cc syringe was used with product that that had previously been prepared under sterile conditions. Injection site: 3-4 mm from the limbus. The used syringe/needle was transferred to a biohazard container. Lid speculum removed. Mask worn during procedure. Patient tolerated procedure well. Count fingers vision was verified. There were no complications. Patient was given the standard instruction sheet. Patient given office phone number/answering service number and advised to call immediately should there be loss of vision or pain, or should they have any other questions or concerns. Pricilla Guzmán

## 2021-03-29 NOTE — PATIENT DISCUSSION
PER DOEK LAST NOTES---Follow with Dr. Kamlesh Barrow for now. If changes send back to Dr. Rik Enriquez. Yes

## 2021-04-27 ENCOUNTER — ESTABLISHED COMPREHENSIVE EXAM (OUTPATIENT)
Dept: URBAN - METROPOLITAN AREA CLINIC 35 | Facility: CLINIC | Age: 76
End: 2021-04-27

## 2021-04-27 DIAGNOSIS — H52.7: ICD-10-CM

## 2021-04-27 DIAGNOSIS — H25.9: ICD-10-CM

## 2021-04-27 DIAGNOSIS — H35.363: ICD-10-CM

## 2021-04-27 PROCEDURE — 92014 COMPRE OPH EXAM EST PT 1/>: CPT

## 2021-04-27 PROCEDURE — 92134 CPTRZ OPH DX IMG PST SGM RTA: CPT

## 2021-04-27 PROCEDURE — 92015 DETERMINE REFRACTIVE STATE: CPT

## 2021-04-27 ASSESSMENT — VISUAL ACUITY
OD_CC: J6
OS_CC: 20/25-1
OS_SC: 20/40
OD_CC: 20/25-1
OS_CC: J6
OD_SC: 20/80-1

## 2021-04-27 ASSESSMENT — TONOMETRY
OD_IOP_MMHG: 18
OS_IOP_MMHG: 18

## 2021-05-10 NOTE — PATIENT DISCUSSION
PER DOEK LAST NOTES---Follow with Dr. John Tapia for now. If changes send back to Dr. Nia Santillan.

## 2021-05-10 NOTE — PATIENT DISCUSSION
Recommended Eylea #23 (2 of 2) injection today.  Pt elects to proceed.  The injection was given without complication. Post injection instructions given and understood by patient.

## 2021-05-10 NOTE — PROCEDURE NOTE: CLINICAL
PROCEDURE NOTE: Eylea 2mg (2 of 2) #23 OS. Diagnosis: Diabetic Macular Edema. Prep: Betadine Drops and Betadine Scrub. Prior to injection, risks/benefits/alternatives discussed including corneal abrasion, infection, loss of vision, hemorrhage, cataract, glaucoma, retinal tears or detachment. A written consent is on file, and the need for today’s injection was discussed and the patient is understanding and wishes to proceed. The entire vial of Eylea was drawn up into a syringe. The opened vial, remaining drug, and filtered needle were disposed of in a certified biohazard container. Betadine prep was performed. Topical anesthesia was induced with Alcaine. 4% lidocaine pledge. A lid speculum was used. A short 30g needle on a 1cc syringe was used with product that that had previously been prepared under sterile conditions. Injection site: 3-4 mm from the limbus. The used syringe/needle was transferred to a biohazard container. Lid speculum removed. Mask worn during procedure. Patient tolerated procedure well. Count fingers vision was verified. There were no complications. Patient was given the standard instruction sheet. Patient given office phone number/answering service number and advised to call immediately should there be loss of vision or pain, or should they have any other questions or concerns. Toby Anne

## 2021-05-26 ENCOUNTER — CATARACT CONSULT (OUTPATIENT)
Dept: URBAN - METROPOLITAN AREA CLINIC 35 | Facility: CLINIC | Age: 76
End: 2021-05-26

## 2021-05-26 DIAGNOSIS — H25.811: ICD-10-CM

## 2021-05-26 DIAGNOSIS — H35.363: ICD-10-CM

## 2021-05-26 DIAGNOSIS — H25.812: ICD-10-CM

## 2021-05-26 DIAGNOSIS — H43.813: ICD-10-CM

## 2021-05-26 DIAGNOSIS — H04.123: ICD-10-CM

## 2021-05-26 PROCEDURE — 92136TC INTERFEROMETRY - TECHNICAL COMPONENT

## 2021-05-26 PROCEDURE — 92025-2 CORNEAL TOPOGRAPHY, PT

## 2021-05-26 PROCEDURE — 99204 OFFICE O/P NEW MOD 45 MIN: CPT

## 2021-05-26 ASSESSMENT — VISUAL ACUITY
OD_SC: J6
OD_CC: J3
OS_SC: J4
OS_SC: 20/40
OD_CC: 20/30
OS_CC: J4
OD_SC: 20/80
OS_CC: 20/30

## 2021-05-26 ASSESSMENT — TONOMETRY
OS_IOP_MMHG: 12
OD_IOP_MMHG: 12

## 2021-06-03 ENCOUNTER — H&P (OUTPATIENT)
Dept: URBAN - METROPOLITAN AREA CLINIC 39 | Facility: CLINIC | Age: 76
End: 2021-06-03

## 2021-06-03 ENCOUNTER — SURGERY/PROCEDURE (OUTPATIENT)
Dept: URBAN - METROPOLITAN AREA CLINIC 35 | Facility: CLINIC | Age: 76
End: 2021-06-03

## 2021-06-03 DIAGNOSIS — H25.812: ICD-10-CM

## 2021-06-03 DIAGNOSIS — H04.123: ICD-10-CM

## 2021-06-03 DIAGNOSIS — H25.811: ICD-10-CM

## 2021-06-03 DIAGNOSIS — H52.7: ICD-10-CM

## 2021-06-03 DIAGNOSIS — H43.813: ICD-10-CM

## 2021-06-03 DIAGNOSIS — H35.363: ICD-10-CM

## 2021-06-03 PROCEDURE — 99211T TECH SERVICE

## 2021-06-03 PROCEDURE — 66999LNSR LENSAR LASER FOR CAT SX

## 2021-06-03 PROCEDURE — 66984CV REMOVE CATARACT, INSERT LENS, CUSTOM VISION

## 2021-06-04 ENCOUNTER — CATARACT POST-OP 1-DAY (OUTPATIENT)
Dept: URBAN - METROPOLITAN AREA CLINIC 39 | Facility: CLINIC | Age: 76
End: 2021-06-04

## 2021-06-04 DIAGNOSIS — Z96.1: ICD-10-CM

## 2021-06-04 DIAGNOSIS — H25.812: ICD-10-CM

## 2021-06-04 PROCEDURE — 99024 POSTOP FOLLOW-UP VISIT: CPT

## 2021-06-04 ASSESSMENT — TONOMETRY: OD_IOP_MMHG: 22

## 2021-06-04 ASSESSMENT — VISUAL ACUITY
OD_SC: 20/25+-
OS_SC: 20/40

## 2021-06-10 ENCOUNTER — POST OP/EVAL OF SECOND EYE (OUTPATIENT)
Dept: URBAN - METROPOLITAN AREA CLINIC 39 | Facility: CLINIC | Age: 76
End: 2021-06-10

## 2021-06-10 ENCOUNTER — SURGERY/PROCEDURE (OUTPATIENT)
Dept: URBAN - METROPOLITAN AREA CLINIC 35 | Facility: CLINIC | Age: 76
End: 2021-06-10

## 2021-06-10 DIAGNOSIS — H25.812: ICD-10-CM

## 2021-06-10 DIAGNOSIS — Z96.1: ICD-10-CM

## 2021-06-10 PROCEDURE — 66999LNSR LENSAR LASER FOR CAT SX

## 2021-06-10 PROCEDURE — 92012 INTRM OPH EXAM EST PATIENT: CPT

## 2021-06-10 PROCEDURE — 66984CV REMOVE CATARACT, INSERT LENS, CUSTOM VISION

## 2021-06-10 ASSESSMENT — VISUAL ACUITY
OS_BAT: 20/60
OS_SC: 20/40
OD_SC: 20/25-2

## 2021-06-10 ASSESSMENT — TONOMETRY
OS_IOP_MMHG: 12
OD_IOP_MMHG: 12

## 2021-06-11 ENCOUNTER — CATARACT POST-OP 1-DAY (OUTPATIENT)
Dept: URBAN - METROPOLITAN AREA CLINIC 35 | Facility: CLINIC | Age: 76
End: 2021-06-11

## 2021-06-11 DIAGNOSIS — H52.7: ICD-10-CM

## 2021-06-11 DIAGNOSIS — Z96.1: ICD-10-CM

## 2021-06-11 PROCEDURE — 99024 POSTOP FOLLOW-UP VISIT: CPT

## 2021-06-11 ASSESSMENT — TONOMETRY: OS_IOP_MMHG: 24

## 2021-06-11 ASSESSMENT — VISUAL ACUITY: OS_SC: 20/40-1

## 2021-06-21 NOTE — PROCEDURE NOTE: CLINICAL
PROCEDURE NOTE: Eylea 2mg(1 of 2) #24 OS. Diagnosis: Moderate Nonproliferative Diabetic Retinopathy. Prep: Betadine Drops and Betadine Scrub. Prior to injection, risks/benefits/alternatives discussed including corneal abrasion, infection, loss of vision, hemorrhage, cataract, glaucoma, retinal tears or detachment. A written consent is on file, and the need for today’s injection was discussed and the patient is understanding and wishes to proceed. The entire vial of Eylea was drawn up into a syringe. The opened vial, remaining drug, and filtered needle were disposed of in a certified biohazard container. Betadine prep was performed. Topical anesthesia was induced with Alcaine. 4% lidocaine pledge. A lid speculum was used. A short 30g needle on a 1cc syringe was used with product that that had previously been prepared under sterile conditions. Injection site: 3-4 mm from the limbus. The used syringe/needle was transferred to a biohazard container. Lid speculum removed. Mask worn during procedure. Patient tolerated procedure well. Count fingers vision was verified. There were no complications. Patient was given the standard instruction sheet. Patient given office phone number/answering service number and advised to call immediately should there be loss of vision or pain, or should they have any other questions or concerns. Rhonda Luis

## 2021-06-21 NOTE — PATIENT DISCUSSION
Worsening today.  Recommended tighten to Q 4 weeks until improved.  Eylea #24 (1 of 2) injection today.  Pt elects to proceed.  The injection was given without complication. Post injection instructions given and understood by patient.  Series of 2 x 4 weeks apart.

## 2021-07-07 ENCOUNTER — POST-OP CATARACT (OUTPATIENT)
Dept: URBAN - METROPOLITAN AREA CLINIC 35 | Facility: CLINIC | Age: 76
End: 2021-07-07

## 2021-07-07 DIAGNOSIS — H26.493: ICD-10-CM

## 2021-07-07 DIAGNOSIS — Z96.1: ICD-10-CM

## 2021-07-07 PROCEDURE — 99024 POSTOP FOLLOW-UP VISIT: CPT

## 2021-07-07 ASSESSMENT — TONOMETRY
OD_IOP_MMHG: 10
OS_IOP_MMHG: 10
OS_IOP_MMHG: 11
OD_IOP_MMHG: 11

## 2021-07-07 ASSESSMENT — VISUAL ACUITY
OS_SC: 20/40
OD_SC: 20/25-1

## 2021-07-19 NOTE — PROCEDURE NOTE: CLINICAL
PROCEDURE NOTE: Eylea 2mg ( 2 of 2) #25 OS. Diagnosis: Moderate Nonproliferative Diabetic Retinopathy. Prep: Betadine Drops and Betadine Scrub. Prior to injection, risks/benefits/alternatives discussed including corneal abrasion, infection, loss of vision, hemorrhage, cataract, glaucoma, retinal tears or detachment. A written consent is on file, and the need for today’s injection was discussed and the patient is understanding and wishes to proceed. The entire vial of Eylea was drawn up into a syringe. The opened vial, remaining drug, and filtered needle were disposed of in a certified biohazard container. Betadine prep was performed. Topical anesthesia was induced with Alcaine. 4% lidocaine pledge. A lid speculum was used. A short 30g needle on a 1cc syringe was used with product that that had previously been prepared under sterile conditions. Injection site: 3-4 mm from the limbus. The used syringe/needle was transferred to a biohazard container. Lid speculum removed. Mask worn during procedure. Patient tolerated procedure well. Count fingers vision was verified. There were no complications. Patient was given the standard instruction sheet. Patient given office phone number/answering service number and advised to call immediately should there be loss of vision or pain, or should they have any other questions or concerns. Dameon Ring

## 2021-07-19 NOTE — PATIENT DISCUSSION
Eylea #25(2 of 2) injection today.  Pt elects to proceed.  The injection was given without complication. Post injection instructions given and understood by patient.

## 2021-07-19 NOTE — PATIENT DISCUSSION
PER DOEK LAST NOTES---Follow with Dr. Yanira Tamayo for now. If changes send back to Dr. Sky Redding.

## 2021-08-16 NOTE — PATIENT DISCUSSION
PER DOEK LAST NOTES---Follow with Dr. Taryn Stein for now. If changes send back to Dr. Marian Pineda.

## 2021-08-16 NOTE — PATIENT DISCUSSION
Rec Eylea #26(1 of 2) injection today.  Pt elects to proceed.  The injection was given without complication. Post injection instructions given and understood by patient.  series of 2 x 4 weeks apart.

## 2021-08-16 NOTE — PROCEDURE NOTE: CLINICAL
PROCEDURE NOTE: Eylea 2mg (1 of 2) #26 OS. Diagnosis: Moderate Nonproliferative Diabetic Retinopathy. Prep: Betadine Drops and Betadine Scrub. Prior to injection, risks/benefits/alternatives discussed including corneal abrasion, infection, loss of vision, hemorrhage, cataract, glaucoma, retinal tears or detachment. A written consent is on file, and the need for today’s injection was discussed and the patient is understanding and wishes to proceed. The entire vial of Eylea was drawn up into a syringe. The opened vial, remaining drug, and filtered needle were disposed of in a certified biohazard container. Betadine prep was performed. Topical anesthesia was induced with Alcaine. 4% lidocaine pledge. A lid speculum was used. A short 30g needle on a 1cc syringe was used with product that that had previously been prepared under sterile conditions. Injection site: 3-4 mm from the limbus. The used syringe/needle was transferred to a biohazard container. Lid speculum removed. Mask worn during procedure. Patient tolerated procedure well. Count fingers vision was verified. There were no complications. Patient was given the standard instruction sheet. Patient given office phone number/answering service number and advised to call immediately should there be loss of vision or pain, or should they have any other questions or concerns. Haven Newell

## 2021-08-19 ENCOUNTER — POST-OP CATARACT (OUTPATIENT)
Dept: URBAN - METROPOLITAN AREA CLINIC 35 | Facility: CLINIC | Age: 76
End: 2021-08-19

## 2021-08-19 DIAGNOSIS — H26.493: ICD-10-CM

## 2021-08-19 DIAGNOSIS — Z96.1: ICD-10-CM

## 2021-08-19 PROCEDURE — 99024 POSTOP FOLLOW-UP VISIT: CPT

## 2021-08-19 ASSESSMENT — VISUAL ACUITY
OD_SC: 20/25+1
OS_CC: J1
OD_SC: J12
OS_SC: J12
OD_CC: J3
OS_SC: 20/25-2

## 2021-08-19 ASSESSMENT — TONOMETRY
OS_IOP_MMHG: 11
OD_IOP_MMHG: 11

## 2021-09-13 NOTE — PROCEDURE NOTE: CLINICAL
PROCEDURE NOTE: Eylea 2mg(2 of 2) #27 OS. Diagnosis: Moderate Nonproliferative Diabetic Retinopathy. Prep: Betadine Drops and Betadine Scrub. Prior to injection, risks/benefits/alternatives discussed including corneal abrasion, infection, loss of vision, hemorrhage, cataract, glaucoma, retinal tears or detachment. A written consent is on file, and the need for today’s injection was discussed and the patient is understanding and wishes to proceed. The entire vial of Eylea was drawn up into a syringe. The opened vial, remaining drug, and filtered needle were disposed of in a certified biohazard container. Betadine prep was performed. Topical anesthesia was induced with Alcaine. 4% lidocaine pledge. A lid speculum was used. A short 30g needle on a 1cc syringe was used with product that that had previously been prepared under sterile conditions. Injection site: 3-4 mm from the limbus. The used syringe/needle was transferred to a biohazard container. Lid speculum removed. Mask worn during procedure. Patient tolerated procedure well. Count fingers vision was verified. There were no complications. Patient was given the standard instruction sheet. Patient given office phone number/answering service number and advised to call immediately should there be loss of vision or pain, or should they have any other questions or concerns. Mariola Salazar

## 2021-09-13 NOTE — PATIENT DISCUSSION
PER JWK LAST NOTES---Follow with Dr. José Luis Irene for now. If changes send back to Dr. Munir De La Paz.

## 2021-09-13 NOTE — PATIENT DISCUSSION
Eylea #27 (2 of 2) injection today.  Pt elects to proceed.  The injection was given without complication. Post injection instructions given and understood by patient.

## 2021-10-11 NOTE — PROCEDURE NOTE: CLINICAL
PROCEDURE NOTE: Eylea 2mg(1 OF 3) #28 OS. Diagnosis: Moderate Nonproliferative Diabetic Retinopathy. Prep: Betadine Drops and Betadine Scrub. Prior to injection, risks/benefits/alternatives discussed including corneal abrasion, infection, loss of vision, hemorrhage, cataract, glaucoma, retinal tears or detachment. A written consent is on file, and the need for today’s injection was discussed and the patient is understanding and wishes to proceed. The entire vial of Eylea was drawn up into a syringe. The opened vial, remaining drug, and filtered needle were disposed of in a certified biohazard container. Betadine prep was performed. Topical anesthesia was induced with Alcaine. 4% lidocaine pledge. A lid speculum was used. A short 30g needle on a 1cc syringe was used with product that that had previously been prepared under sterile conditions. Injection site: 3-4 mm from the limbus. The used syringe/needle was transferred to a biohazard container. Lid speculum removed. Mask worn during procedure. Patient tolerated procedure well. Count fingers vision was verified. There were no complications. Patient was given the standard instruction sheet. Patient given office phone number/answering service number and advised to call immediately should there be loss of vision or pain, or should they have any other questions or concerns. Haven Newell

## 2021-10-11 NOTE — PATIENT DISCUSSION
No new fluid/blood.  Eylea #28 (1 of 3) injection today.  Pt elects to proceed.  The injection was given without complication. Post injection instructions given and understood by patient.  SERIES OF 3 X 4 WEEKS APART.

## 2021-10-11 NOTE — PATIENT DISCUSSION
PER DOEK LAST NOTES---Follow with Dr. Derick Myers for now. If changes send back to Dr. Kenney Lane.

## 2021-11-08 NOTE — PROCEDURE NOTE: CLINICAL
PROCEDURE NOTE: Eylea 2mg (2 of 3) #29 OS. Diagnosis: Moderate Nonproliferative Diabetic Retinopathy. Prep: Betadine Drops and Betadine Scrub. Prior to injection, risks/benefits/alternatives discussed including corneal abrasion, infection, loss of vision, hemorrhage, cataract, glaucoma, retinal tears or detachment. A written consent is on file, and the need for today’s injection was discussed and the patient is understanding and wishes to proceed. The entire vial of Eylea was drawn up into a syringe. The opened vial, remaining drug, and filtered needle were disposed of in a certified biohazard container. Betadine prep was performed. Topical anesthesia was induced with Alcaine. 4% lidocaine pledge. A lid speculum was used. A short 30g needle on a 1cc syringe was used with product that that had previously been prepared under sterile conditions. Injection site: 3-4 mm from the limbus. The used syringe/needle was transferred to a biohazard container. Lid speculum removed. Mask worn during procedure. Patient tolerated procedure well. Count fingers vision was verified. There were no complications. Patient was given the standard instruction sheet. Patient given office phone number/answering service number and advised to call immediately should there be loss of vision or pain, or should they have any other questions or concerns. Isael Sylvester

## 2021-11-08 NOTE — PATIENT DISCUSSION
No new fluid/blood.  Eylea #29 (2 of 3) injection today.  Pt elects to proceed.  The injection was given without complication. Post injection instructions given and understood by patient.  SERIES OF 3 X 4 WEEKS APART.

## 2021-11-08 NOTE — PATIENT DISCUSSION
Patient needs refills on Timolol bid OU and Dorzolamide bid OS.  Per J, will send a letter to Dr Mylene Jeans team.

## 2021-11-23 ENCOUNTER — ESTABLISHED COMPREHENSIVE EXAM (OUTPATIENT)
Dept: URBAN - METROPOLITAN AREA CLINIC 35 | Facility: CLINIC | Age: 76
End: 2021-11-23

## 2021-11-23 DIAGNOSIS — Z96.1: ICD-10-CM

## 2021-11-23 DIAGNOSIS — H35.363: ICD-10-CM

## 2021-11-23 DIAGNOSIS — H26.493: ICD-10-CM

## 2021-11-23 DIAGNOSIS — H43.813: ICD-10-CM

## 2021-11-23 PROCEDURE — 92015GRNC REFRACTION GLASSES RECHECK - NO CHARGE

## 2021-11-23 PROCEDURE — 92134 CPTRZ OPH DX IMG PST SGM RTA: CPT

## 2021-11-23 ASSESSMENT — TONOMETRY
OS_IOP_MMHG: 14
OD_IOP_MMHG: 14

## 2021-11-23 ASSESSMENT — VISUAL ACUITY
OS_SC: J10
OD_SC: J12
OD_SC: 20/30+2
OS_SC: 20/25

## 2021-12-06 NOTE — PATIENT DISCUSSION
Patient needs refills on Timolol bid OU and Dorzolamide bid OS.  Per J, will send a letter to Dr Alonso Shadow team.

## 2021-12-06 NOTE — PATIENT DISCUSSION
Eylea #30 (3 of 3) injection today.  Pt elects to proceed.  The injection was given without complication. Post injection instructions given and understood by patient.  SERIES OF 3 X 4 WEEKS APART.

## 2021-12-06 NOTE — PROCEDURE NOTE: CLINICAL
PROCEDURE NOTE: Eylea 2mg (3 of 3) #30 OS. Diagnosis: Moderate Nonproliferative Diabetic Retinopathy. Prep: Betadine Drops and Betadine Scrub. Prior to injection, risks/benefits/alternatives discussed including corneal abrasion, infection, loss of vision, hemorrhage, cataract, glaucoma, retinal tears or detachment. A written consent is on file, and the need for today’s injection was discussed and the patient is understanding and wishes to proceed. The entire vial of Eylea was drawn up into a syringe. The opened vial, remaining drug, and filtered needle were disposed of in a certified biohazard container. Betadine prep was performed. Topical anesthesia was induced with Alcaine. 4% lidocaine pledge. A lid speculum was used. A short 30g needle on a 1cc syringe was used with product that that had previously been prepared under sterile conditions. Injection site: 3-4 mm from the limbus. The used syringe/needle was transferred to a biohazard container. Lid speculum removed. Mask worn during procedure. Patient tolerated procedure well. Count fingers vision was verified. There were no complications. Patient was given the standard instruction sheet. Patient given office phone number/answering service number and advised to call immediately should there be loss of vision or pain, or should they have any other questions or concerns. Roderick Good

## 2021-12-06 NOTE — PATIENT DISCUSSION
PER DOEK LAST NOTES---Follow with Dr. Jaz Connor for now. If changes send back to Dr. Sharonda Pantoja.

## 2022-01-10 NOTE — PATIENT DISCUSSION
No DME seen today. No treatment recommended at this time, no increase in [place] potential due to damage from advanced Glc and DRCheryle

## 2022-01-10 NOTE — PATIENT DISCUSSION
Eylea #31 (1 of 3) injection today.  Pt elects to proceed.  The injection was given without complication. Post injection instructions given and understood by patient.  SERIES OF 3 X 4 WEEKS APART.

## 2022-01-10 NOTE — PROCEDURE NOTE: CLINICAL
PROCEDURE NOTE: Eylea 2mg(1 of 3) #31 OS. Diagnosis: Moderate Nonproliferative Diabetic Retinopathy. Prep: Betadine Drops and Betadine Scrub. Prior to injection, risks/benefits/alternatives discussed including corneal abrasion, infection, loss of vision, hemorrhage, cataract, glaucoma, retinal tears or detachment. A written consent is on file, and the need for today’s injection was discussed and the patient is understanding and wishes to proceed. The entire vial of Eylea was drawn up into a syringe. The opened vial, remaining drug, and filtered needle were disposed of in a certified biohazard container. Betadine prep was performed. Topical anesthesia was induced with Alcaine. 4% lidocaine pledge. A lid speculum was used. A short 30g needle on a 1cc syringe was used with product that that had previously been prepared under sterile conditions. Injection site: 3-4 mm from the limbus. The used syringe/needle was transferred to a biohazard container. Lid speculum removed. Mask worn during procedure. Patient tolerated procedure well. Count fingers vision was verified. There were no complications. Patient was given the standard instruction sheet. Patient given office phone number/answering service number and advised to call immediately should there be loss of vision or pain, or should they have any other questions or concerns. Essence Judd

## 2022-02-07 NOTE — PATIENT DISCUSSION
Patient here for Eylea #32 (2 of 3) injection today.  Pt elects to proceed.  The injection was given without complication. Post injection instructions given and understood by patient.  Series of 3 x 4 weeks apart. Return for Eylea (3 of 3) in 4 weeks.

## 2022-02-07 NOTE — PATIENT DISCUSSION
prev. visit Patient needed refills on Timolol bid OU and Dorzolamide bid OS.  Per J, will send a letter to Dr Rigoberto Frausto team.

## 2022-02-07 NOTE — PROCEDURE NOTE: CLINICAL
PROCEDURE NOTE: Eylea 2mg(2 of 3) #32 OS. Diagnosis: Moderate Nonproliferative Diabetic Retinopathy. Prep: Betadine Drops and Betadine Scrub. Prior to injection, risks/benefits/alternatives discussed including corneal abrasion, infection, loss of vision, hemorrhage, cataract, glaucoma, retinal tears or detachment. A written consent is on file, and the need for today’s injection was discussed and the patient is understanding and wishes to proceed. The entire vial of Eylea was drawn up into a syringe. The opened vial, remaining drug, and filtered needle were disposed of in a certified biohazard container. Betadine prep was performed. Topical anesthesia was induced with Alcaine. 4% lidocaine pledge. A lid speculum was used. A short 30g needle on a 1cc syringe was used with product that that had previously been prepared under sterile conditions. Injection site: 3-4 mm from the limbus. The used syringe/needle was transferred to a biohazard container. Lid speculum removed. Mask worn during procedure. Patient tolerated procedure well. Count fingers vision was verified. There were no complications. Patient was given the standard instruction sheet. Patient given office phone number/answering service number and advised to call immediately should there be loss of vision or pain, or should they have any other questions or concerns. Eric Padilla

## 2022-03-07 NOTE — PATIENT DISCUSSION
prev. visit Patient needed refills on Timolol bid OU and Dorzolamide bid OS.  Per J, will send a letter to Dr Jewels Maher team.

## 2022-03-07 NOTE — PROCEDURE NOTE: CLINICAL
PROCEDURE NOTE: Eylea 2mg (3 of 3) #33 OS. Diagnosis: Moderate Nonproliferative Diabetic Retinopathy. Prep: Betadine Drops and Betadine Scrub. Prior to injection, risks/benefits/alternatives discussed including corneal abrasion, infection, loss of vision, hemorrhage, cataract, glaucoma, retinal tears or detachment. A written consent is on file, and the need for today’s injection was discussed and the patient is understanding and wishes to proceed. The entire vial of Eylea was drawn up into a syringe. The opened vial, remaining drug, and filtered needle were disposed of in a certified biohazard container. Betadine prep was performed. Topical anesthesia was induced with Alcaine. 4% lidocaine pledge. A lid speculum was used. A short 30g needle on a 1cc syringe was used with product that that had previously been prepared under sterile conditions. Injection site: 3-4 mm from the limbus. The used syringe/needle was transferred to a biohazard container. Lid speculum removed. Mask worn during procedure. Patient tolerated procedure well. Count fingers vision was verified. There were no complications. Patient was given the standard instruction sheet. Patient given office phone number/answering service number and advised to call immediately should there be loss of vision or pain, or should they have any other questions or concerns. Deondre Elliott

## 2022-03-07 NOTE — PATIENT DISCUSSION
Patient here for Lanre #33 (3 of 3) injection today.  Pt elects to proceed.  The injection was given without complication. Post injection instructions given and understood by patient. Ends series of 3 x 4 weeks apart. Return in 4 weeks for OCT.

## 2022-03-21 NOTE — PATIENT DISCUSSION
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

## 2022-03-21 NOTE — PATIENT DISCUSSION
The condition appears to be stable; recommend changing latanoprost to Vyzulta (gave sample) and close monitoring.

## 2022-03-21 NOTE — PATIENT DISCUSSION
Recommend to discontinue latanoprost and start Vyzulta OU QHS; sample given. Explained that Erminio Jason will help to dilate the veins and help the blood flow to the eyes. Patient should keep scheduled appt for drck/oct and eylea 4/4/22.

## 2022-04-11 NOTE — PROCEDURE NOTE: CLINICAL
PROCEDURE NOTE: Eylea Prefilled Syringe 2mg (1 of 3 ) #34 OS. Diagnosis: Moderate Nonproliferative Diabetic Retinopathy. Prep: Betadine Drops and Betadine Scrub. Prior to injection, risks/benefits/alternatives discussed including corneal abrasion, infection, loss of vision, hemorrhage, cataract, glaucoma, retinal tears or detachment. A written consent is on file, and the need for today's injection was discussed and the patient is understanding and wishes to proceed. A 30G needle was placed on an Eylea 2mg/0.05ml Pre-filled Syringe. Betadine prep was performed. Topical anesthesia was induced with Alcaine. 4% lidocaine pledge. A lid speculum was used. An intravitreal injection of Eylea was given. Injection site: 3-4 mm from the limbus. The used syringe/needle was transferred to a biohazard container. Lid speculum removed. Mask worn during procedure. Patient tolerated procedure well. Count fingers vision was verified. There were no complications. Patient was given the standard instruction sheet. Eric Padilla

## 2022-04-11 NOTE — PATIENT DISCUSSION
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  Also combo BRVO-circulation present and improving today but as discussed last visit this is like a stroke to the eye and visual gain can be limited.

## 2022-04-11 NOTE — PATIENT DISCUSSION
An examination that was significantly and separately identifiable from the procedure performed today was also completed for this retinal artery occlusion. No signs of neovascularization or worsening retinal ischemia, either or which unrecognized or untreated can be a significant source of significant vision loss, was seen today. There is no evidence of disease progression requiring any additional intervention for this condition today. Will continue to monitor closely.

## 2022-04-11 NOTE — PATIENT DISCUSSION
Recommended Eylea #34(1 of 3)  injection today. The injection was given and tolerated well by patient. Post-injection instructions were reviewed and understood by the patient.  Series of 3 x 4 weeks apart.

## 2022-04-15 NOTE — PATIENT DISCUSSION
Glasses Prescription given to patient for remake. Protopic Counseling: Patient may experience a mild burning sensation during topical application. Protopic is not approved in children less than 2 years of age. There have been case reports of hematologic and skin malignancies in patients using topical calcineurin inhibitors although causality is questionable.

## 2022-04-28 ENCOUNTER — FOLLOW UP (OUTPATIENT)
Dept: URBAN - METROPOLITAN AREA CLINIC 35 | Facility: CLINIC | Age: 77
End: 2022-04-28

## 2022-04-28 DIAGNOSIS — H01.026: ICD-10-CM

## 2022-04-28 DIAGNOSIS — H00.024: ICD-10-CM

## 2022-04-28 DIAGNOSIS — H26.493: ICD-10-CM

## 2022-04-28 DIAGNOSIS — H01.023: ICD-10-CM

## 2022-04-28 PROCEDURE — 92012 INTRM OPH EXAM EST PATIENT: CPT

## 2022-04-28 RX ORDER — DOXYCYCLINE HYCLATE 100 MG/1
1 TABLET ORAL EVERY 12 HOURS
Start: 2022-04-28 | End: 2022-05-12

## 2022-04-28 ASSESSMENT — TONOMETRY
OD_IOP_MMHG: 12
OS_IOP_MMHG: 11

## 2022-04-28 ASSESSMENT — VISUAL ACUITY
OS_SC: 20/25-1
OD_SC: 20/40-2

## 2022-05-04 ENCOUNTER — ESTABLISHED PATIENT (OUTPATIENT)
Dept: URBAN - METROPOLITAN AREA CLINIC 35 | Facility: CLINIC | Age: 77
End: 2022-05-04

## 2022-05-04 DIAGNOSIS — H00.024: ICD-10-CM

## 2022-05-04 DIAGNOSIS — H01.026: ICD-10-CM

## 2022-05-04 DIAGNOSIS — H01.023: ICD-10-CM

## 2022-05-04 DIAGNOSIS — H04.123: ICD-10-CM

## 2022-05-04 PROCEDURE — 99213 OFFICE O/P EST LOW 20 MIN: CPT

## 2022-05-04 PROCEDURE — 92285 EXTERNAL OCULAR PHOTOGRAPHY: CPT

## 2022-05-04 ASSESSMENT — VISUAL ACUITY
OD_SC: 20/40-2
OS_SC: 20/25

## 2022-05-09 NOTE — PROCEDURE NOTE: CLINICAL
PROCEDURE NOTE: Eylea Prefilled Syringe 2mg(2 of 3) #35 OS. Diagnosis: Moderate Nonproliferative Diabetic Retinopathy. Prep: Betadine Drops and Betadine Scrub. Prior to injection, risks/benefits/alternatives discussed including corneal abrasion, infection, loss of vision, hemorrhage, cataract, glaucoma, retinal tears or detachment. A written consent is on file, and the need for today's injection was discussed and the patient is understanding and wishes to proceed. A 30G needle was placed on an Eylea 2mg/0.05ml Pre-filled Syringe. Betadine prep was performed. Topical anesthesia was induced with Alcaine. 4% lidocaine pledge. A lid speculum was used. An intravitreal injection of Eylea was given. Injection site: 3-4 mm from the limbus. The used syringe/needle was transferred to a biohazard container. Lid speculum removed. Mask worn during procedure. Patient tolerated procedure well. Count fingers vision was verified. There were no complications. Patient was given the standard instruction sheet. Toi Paulino

## 2022-05-09 NOTE — PATIENT DISCUSSION
Recommended Eylea #35 (2 of 3)  injection today. The injection was given and tolerated well by patient. Post-injection instructions were reviewed and understood by the patient.  return in 4 weeks as scheduled for Eylea (3 of 3) /JODIE OU/OCT OU per: MEDARDO *** may change to Vabysmo in Future after 6/6/22 visit.

## 2022-05-25 ENCOUNTER — FOLLOW UP (OUTPATIENT)
Dept: URBAN - METROPOLITAN AREA CLINIC 35 | Facility: CLINIC | Age: 77
End: 2022-05-25

## 2022-05-25 DIAGNOSIS — H00.024: ICD-10-CM

## 2022-05-25 DIAGNOSIS — H04.123: ICD-10-CM

## 2022-05-25 PROCEDURE — 92285 EXTERNAL OCULAR PHOTOGRAPHY: CPT

## 2022-05-25 PROCEDURE — 67800 REMOVE EYELID LESION: CPT

## 2022-05-25 PROCEDURE — 99213 OFFICE O/P EST LOW 20 MIN: CPT

## 2022-05-25 ASSESSMENT — VISUAL ACUITY
OD_SC: 20/40-2
OS_SC: 20/25

## 2022-06-06 NOTE — PATIENT DISCUSSION
Recommended VABYSMO #1(1 OF 4) injection today. The injection was given and tolerated well by patient. Post-injection instructions were reviewed and understood by the patient.  series of 4 x 4 weeks apart.

## 2022-06-06 NOTE — PATIENT DISCUSSION
+ worsening of DME and vision drop despite monthly tx w/ Eylea and prior Avastin.  Tx options discussed, at this time rec switch to Vabysmo.  Will start with series of 4 x 4 weeks apart with exam/testing at last inj to determine medication response and tx intervals.

## 2022-06-06 NOTE — PROCEDURE NOTE: CLINICAL
PROCEDURE NOTE: Vabysmo Injection(1 of 4) #1 OS. Diagnosis: Moderate Nonproliferative Diabetic Retinopathy. Anesthesia: Topical. Prep: Betadine Drops and Betadine Scrub. Prior to injection, risks/benefits/alternatives discussed including corneal abrasion, infection, loss of vision, hemorrhage, cataract, glaucoma, retinal tears or detachment. A written consent is on file, and the need for today's injection was discussed and the patient is understanding and wishes to proceed. A 30G needle was placed on a Vabysmo 6mg/0.05ml Pre-filled Syringe. Betadine prep was performed. Topical anesthesia was induced with Alcaine. 4% lidocaine pledge. A lid speculum was used. An intravitreal injection of Vabysmo was given. Injection site: 3-4 mm from the limbus. The used syringe/needle was transferred to a biohazard container. Lid speculum removed. Mask worn during procedure. Patient tolerated procedure well. Count fingers vision was verified. There were no complications. Patient was given the standard instruction sheet. Deidra Anderson

## 2022-06-06 NOTE — PATIENT DISCUSSION
An examination that was significantly and separately identifiable from the procedure performed today was also completed for this retinal artery occlusion. No signs of new neovascularization or worsening retinal ischemia, either or which unrecognized or untreated can be a significant source of significant vision loss, was seen today. Will continue to monitor closely.  Poor visual prognosis due to this condition.

## 2022-06-15 ENCOUNTER — SURGERY/PROCEDURE (OUTPATIENT)
Dept: URBAN - METROPOLITAN AREA SURGERY 14 | Facility: SURGERY | Age: 77
End: 2022-06-15

## 2022-06-15 ENCOUNTER — CONSULTATION/EVALUATION (OUTPATIENT)
Dept: URBAN - METROPOLITAN AREA CLINIC 39 | Facility: CLINIC | Age: 77
End: 2022-06-15

## 2022-06-15 DIAGNOSIS — H26.493: ICD-10-CM

## 2022-06-15 DIAGNOSIS — H52.7: ICD-10-CM

## 2022-06-15 DIAGNOSIS — H01.026: ICD-10-CM

## 2022-06-15 DIAGNOSIS — H01.023: ICD-10-CM

## 2022-06-15 PROCEDURE — 6682150 YAG CAPSULOTOMY

## 2022-06-15 PROCEDURE — 92014 COMPRE OPH EXAM EST PT 1/>: CPT

## 2022-06-15 ASSESSMENT — VISUAL ACUITY
OS_BAT: 20/70
OD_SC: 20/25-1
OD_BAT: 20/60
OS_SC: 20/20

## 2022-07-05 ENCOUNTER — POST-OP (OUTPATIENT)
Dept: URBAN - METROPOLITAN AREA CLINIC 35 | Facility: CLINIC | Age: 77
End: 2022-07-05

## 2022-07-05 DIAGNOSIS — H52.7: ICD-10-CM

## 2022-07-05 DIAGNOSIS — H26.493: ICD-10-CM

## 2022-07-05 PROCEDURE — 99024 POSTOP FOLLOW-UP VISIT: CPT

## 2022-07-05 ASSESSMENT — VISUAL ACUITY
OS_SC: 20/25-2
OU_SC: 20/25+2
OD_SC: 20/25-2

## 2022-07-05 ASSESSMENT — TONOMETRY
OD_IOP_MMHG: 16
OS_IOP_MMHG: 17

## 2022-07-13 NOTE — PROCEDURE NOTE: CLINICAL
COVID-19 Outpatient Progress Note    Assessment/Plan:    Problem List Items Addressed This Visit    None     Visit Diagnoses     COVID-19 virus infection    -  Primary    Relevant Medications    Cholecalciferol (Vitamin D3) 50 MCG (2000 UT) capsule         COVID-19 virus infection  Symptom onset and positive home antigen test on 7/7  Last fever 2 days ago  Has lingering fatigue, myalgias, dry cough and shortness of breath  Outside of the window for paxlovid therapy  · Encouraged vitamin C and D supplementation  · Continue allegra and flonase  · OTC Robitussin PRN for cough  · Continue to isolate for 10 days from symptom onset and afebrile  · Note provided to return to work 7/18/2022  · Encouraged mask wearing if necessary to go out in public until 10 days from symptom onset  · Encouraged getting COVID booster when feeling better  · Precautions of when to present to the ED given    Disposition:     Discussed symptom directed medication options with patient  Discussed vitamin D, vitamin C, and/or zinc supplementation with patient  I have spent 15 minutes directly with the patient  Greater than 50% of this time was spent in counseling/coordination of care regarding: instructions for management and patient and family education  Encounter provider Shante Green MD    Provider located at [address] [address]    Recent Visits  Date Type Provider Dept   07/07/22 Office Visit Sixto Runner, DO [facility]   Showing recent visits within past 7 days and meeting all other requirements  Today's Visits  Date Type Provider Dept   07/13/22 Ilene Ahn MD [facility]   Showing today's visits and meeting all other requirements  Future Appointments  No visits were found meeting these conditions    Showing future appointments within next 150 PROCEDURE NOTE: Eylea 2mg (2 of 2) #11 OS. Diagnosis: Moderate Nonproliferative Diabetic Retinopathy. Prior to injection, risks/benefits/alternatives discussed including corneal abrasion, infection, loss of vision, hemorrhage, cataract, glaucoma, retinal tears or detachment. A written consent is on file, and the need for today’s injection was discussed and the patient is understanding and wishes to proceed. The entire vial of Eylea was drawn up into a syringe. The opened vial, remaining drug, and filtered needle were disposed of in a certified biohazard container. Betadine prep was performed. Topical anesthesia was induced with Alcaine. 4% lidocaine pledge. A lid speculum was used. A short 30g needle on a 1cc syringe was used with product that that had previously been prepared under sterile conditions. Injection site: 3-4 mm from the limbus. The used syringe/needle was transferred to a biohazard container. Lid speculum removed. Mask worn during procedure. Patient tolerated procedure well. Count fingers vision was verified. There were no complications. Patient was given the standard instruction sheet. Patient given office phone number/answering service number and advised to call immediately should there be loss of vision or pain, or should they have any other questions or concerns. Elia Domínguez days and meeting all other requirements     This virtual check-in was done via [facility] and patient was informed that this is a secure, HIPAA-compliant platform  He agrees to proceed  Patient agrees to participate in a virtual check in via telephone or video visit instead of presenting to the office to address urgent/immediate medical needs  Patient is aware this is a billable service  After connecting through [facility], the patient was identified by name and date of birth  Chayo Garcia was informed that this was a telemedicine visit and that the exam was being conducted confidentially over secure lines  My office door was closed  No one else was in the room  Chayo Garcia acknowledged consent and understanding of privacy and security of the telemedicine visit  I informed the patient that I have reviewed his record in Epic and presented the opportunity for him to ask any questions regarding the visit today  The patient agreed to participate  Verification of patient location:  Patient is located in the following state in which I hold an active license: [place]    Subjective:   Chayo Garcia is a 34 y o  male who is concerned about COVID-19  Patient's symptoms include fever, fatigue, malaise, nasal congestion, sore throat, cough, shortness of breath, chest tightness, nausea, myalgias and headache  Patient denies anosmia, loss of taste, abdominal pain and vomiting      - Date of symptom onset: 7/7/2022  - Date of exposure: 7/4/2022      COVID-19 vaccination status: Fully vaccinated (primary series)    Symptom onset and positive home antigen test on 7/7  Symptoms have been improving but patient continues to have fatigue, malaise, and dry cough with some shortness of breath  Does not have access to pulse ox at home  He last had a fever 2 days ago  He works in a warehouse in close proximity to [employer]  He is agreeable to getting his booster when feeling better      Lab Results   Component Value Date    SARSCOV2 Positive (A) 01/31/2022    SARSCOV2 Not Detected 06/08/2020     Past Medical History:   Diagnosis Date    Irregular heartbeat     Last Assessed [id number]     History reviewed  No pertinent surgical history  Current Outpatient Medications   Medication Sig Dispense Refill    Cholecalciferol (Vitamin D3) 50 MCG (2000 UT) capsule Take 1 capsule (2,000 Units total) by mouth daily 30 capsule 0    fexofenadine (ALLEGRA) 180 MG tablet Take 180 mg by mouth daily      fluticasone (Flonase Sensimist) 27 5 MCG/SPRAY nasal spray 2 sprays into each nostril daily      SUMAtriptan (Imitrex) 50 mg tablet Take 1 tablet (50 mg total) by mouth once as needed for migraine for up to 1 dose 9 tablet 0    montelukast (SINGULAIR) 10 mg tablet Take 1 tablet (10 mg total) by mouth daily at bedtime (Patient not taking: No sig reported) 30 tablet 5    topiramate (TOPAMAX) 25 mg sprinkle capsule Take 1 capsule (25 mg total) by mouth 2 (two) times a day - start with 1 cap at night for a week and then 1 twice daily (Patient not taking: Reported on 7/13/2022) 90 capsule 1     No current facility-administered medications for this visit  Allergies   Allergen Reactions    Other Itching     Seasonal allergies- eyes and nose itch, stuffy , wheezing       Review of Systems   Constitutional: Positive for fatigue and fever  Negative for appetite change  HENT: Positive for congestion and sore throat  Respiratory: Positive for cough, chest tightness and shortness of breath  Cardiovascular: Negative for chest pain  Gastrointestinal: Positive for nausea  Negative for abdominal pain and vomiting  Musculoskeletal: Positive for myalgias  Neurological: Positive for headaches  Negative for dizziness  Objective: There were no vitals filed for this visit  Physical Exam  Constitutional:       General: He is not in acute distress    Eyes:      Conjunctiva/sclera: Conjunctivae normal    Pulmonary:      Effort: Pulmonary effort is normal  No respiratory distress  Neurological:      Mental Status: He is alert and oriented to person, place, and time  Psychiatric:         Speech: Speech normal          Behavior: Behavior normal  Behavior is cooperative  VIRTUAL VISIT [address] verbally agrees to participate in [employer]  Pt is aware that [employer] could be limited without vital signs or the ability to perform a full hands-on physical exam  Delmar Maxwell understands he or the provider may request at any time to terminate the video visit and request the patient to seek care or treatment in person

## 2022-07-15 ENCOUNTER — POST-OP (OUTPATIENT)
Dept: URBAN - METROPOLITAN AREA CLINIC 35 | Facility: CLINIC | Age: 77
End: 2022-07-15

## 2022-07-15 DIAGNOSIS — H01.023: ICD-10-CM

## 2022-07-15 DIAGNOSIS — H26.493: ICD-10-CM

## 2022-07-15 DIAGNOSIS — H52.7: ICD-10-CM

## 2022-07-15 DIAGNOSIS — H01.026: ICD-10-CM

## 2022-07-15 PROCEDURE — 99211T TECH SERVICE

## 2022-07-15 ASSESSMENT — VISUAL ACUITY
OD_SC: 20/25
OU_SC: 20/20-2
OS_SC: 20/25-2

## 2022-07-18 NOTE — PATIENT DISCUSSION
7/18/22: GIVEN SEVERITY OF NON PERFUSION, RECOMMEND CONSIDERING CAROTID EVALUATION TO R/O SYSTEMIC DISEASE COMPROMISING HER VISION. WILL SEND LETTER TO PCP.

## 2022-07-18 NOTE — PATIENT DISCUSSION
DISCUSSED THE DX, IMAGES, PROGNOSIS AND TX PLAN. PT REQUIRES TX, RISK OF VI: MOD/HIGH. ALL QUESTIONS WERE ANSWERED. report given to luz marina rn, aware of plan, pending psych admission, wife Liane [phone].  Requesting a phone call when psych bed obtained.

## 2022-07-18 NOTE — PATIENT DISCUSSION
7/18/22: CME OD NASALLY WITH ERM. VISION STABLE BUT POOR PROGNOSIS GIVEN SCARRING AND RPE ATROPHY. MAY BENEFIT FROM TX IF WORSENING. OTHERWISE, CONT TO OBSERVE.

## 2022-07-18 NOTE — PATIENT DISCUSSION
given this worsening compared to last visit, WILL HOLD OFF VABYSMO AND RESTART 2050 Pivotshare. Based on today's exam, diagnostic studies, and/or review of records, the determination was made for treatment today. EYLEA 2mg recommended TODAY after discussion of benefits, risks and alternatives. The injection was given and tolerated well by the patient. Post-injection instructions were reviewed and understood by the patient. Procedure was performed without complications. Instructed to call immediately if any new distortion, blurring, decreased vision or eye pain.

## 2022-07-18 NOTE — PATIENT DISCUSSION
7/18/22: appearance OF CRVO ON EXAMINATION WITH WORSENING FLAME AND D/B HEME IN 4 QUADRANTS. ? SHUNTS VESSELS IN ONH. FA SHOWED.  SEE #1.

## 2022-07-18 NOTE — PROCEDURE NOTE: CLINICAL
PROCEDURE NOTE: Eylea PFS #36 OS. Diagnosis: Central Retinal Vein Occlusion with Macular Edema. Anesthesia: Topical. Prep: Betadine Drops. Prior to injection, risks/benefits/alternatives discussed including but not limited to infection, loss of vision or eye, hemorrhage, cataract, glaucoma, retinal tears or detachment. The patient wished to proceed with treatment. Betadine prep was performed. Topical anesthesia was induced with Alcaine. Additional anesthesia was achieved using drop(s) or injection checked above. A drop of Povidone-iodine 5% ophthalmic solution was instilled over the injection site and in the inferior fornix. A single use prefilled syringe of intravitreal Eylea 2mg/0.05ml was used and excess was disposed of as waste. The needle was passed 3.0 mm posterior to the limbus in pseudophakic patients, and 3.5 mm posterior to the limbus in phakic patients. Injection time: *. Patient tolerated procedure well. There were no complications. The eye was irrigated with sterile irrigating solution. Post procedure instructions given. The patient was instructed to use Artificial Tears q.i.d. p.r.n for comfort. The patient was instructed to return for re-evaluation in approximately 4-12 weeks depending on his/her condition and was told to call immediately if vision decreases and/or if his/her eye becomes red, painful, and/or light sensitive. The patient was instructed to go to the emergency room or call 911 if unable to reach the doctor within an hour or two of trying or calling. Deondre Elliott

## 2022-07-18 NOTE — PATIENT DISCUSSION
7/18/22: appearance OF CRVO ON EXAMINATION WITH WORSENING FLAME AND D/B HEME IN 4 QUADRANTS. ? SHUNTS VESSELS IN ONH. FA SHOWED.

## 2022-08-15 NOTE — PROCEDURE NOTE: CLINICAL
PROCEDURE NOTE: Eylea PFS OS. Diagnosis: Moderate Nonproliferative Diabetic Retinopathy. Anesthesia: Akten Gel 3.5%. Prep: Betadine Drops. Prior to injection, risks/benefits/alternatives discussed including but not limited to infection, loss of vision or eye, hemorrhage, cataract, glaucoma, retinal tears or detachment. The patient wished to proceed with treatment. Betadine prep was performed. Topical anesthesia was induced with Alcaine. Additional anesthesia was achieved using drop(s) or injection checked above. A drop of Povidone-iodine 5% ophthalmic solution was instilled over the injection site and in the inferior fornix. A single use prefilled syringe of intravitreal Eylea 2mg/0.05ml was used and excess was disposed of as waste. The needle was passed 3.0 mm posterior to the limbus in pseudophakic patients, and 3.5 mm posterior to the limbus in phakic patients. Injection time: *. Patient tolerated procedure well. There were no complications. The eye was irrigated with sterile irrigating solution. Post procedure instructions given. The patient was instructed to use Artificial Tears q.i.d. p.r.n for comfort. The patient was instructed to return for re-evaluation in approximately 4-12 weeks depending on his/her condition and was told to call immediately if vision decreases and/or if his/her eye becomes red, painful, and/or light sensitive. The patient was instructed to go to the emergency room or call 911 if unable to reach the doctor within an hour or two of trying or calling. Effie Quan

## 2022-08-30 ENCOUNTER — POST-OP (OUTPATIENT)
Dept: URBAN - METROPOLITAN AREA CLINIC 35 | Facility: CLINIC | Age: 77
End: 2022-08-30

## 2022-08-30 DIAGNOSIS — H01.026: ICD-10-CM

## 2022-08-30 DIAGNOSIS — H04.123: ICD-10-CM

## 2022-08-30 DIAGNOSIS — H01.023: ICD-10-CM

## 2022-08-30 PROCEDURE — 99024 POSTOP FOLLOW-UP VISIT: CPT

## 2022-08-30 ASSESSMENT — VISUAL ACUITY
OS_SC: 20/20-1 FUZZY
OD_SC: 20/25 FUZZY

## 2022-08-30 ASSESSMENT — TONOMETRY
OD_IOP_MMHG: 12
OS_IOP_MMHG: 11

## 2022-09-11 NOTE — PATIENT DISCUSSION
Based on today’s exam, diagnostic studies, and/or review of records, the determination was made for treatment today. 2

## 2022-09-20 NOTE — PROCEDURE NOTE: CLINICAL
PROCEDURE NOTE: Eylea Prefilled Syringe 2mg OS. Diagnosis: Diabetic Macular Edema. Prep: Betadine Drops and Betadine Scrub. Prior to injection, risks/benefits/alternatives discussed including corneal abrasion, infection, loss of vision, hemorrhage, cataract, glaucoma, retinal tears or detachment. A written consent is on file, and the need for today's injection was discussed and the patient is understanding and wishes to proceed. A 30G needle was placed on an Eylea 2mg/0.05ml Pre-filled Syringe. Betadine prep was performed. Topical anesthesia was induced with Alcaine. 4% lidocaine pledge. A lid speculum was used. An intravitreal injection of Eylea was given. Injection site: 3-4 mm from the limbus. The used syringe/needle was transferred to a biohazard container. Lid speculum removed. Mask worn during procedure. Patient tolerated procedure well. Count fingers vision was verified. There were no complications. Patient was given the standard instruction sheet. [facility]

## 2022-11-09 NOTE — PROCEDURE NOTE: CLINICAL
PROCEDURE NOTE: Eylea Prefilled Syringe 2mg OS. Diagnosis: Diabetic Macular Edema. Prep: Betadine Drops and Betadine Scrub. Prior to injection, risks/benefits/alternatives discussed including corneal abrasion, infection, loss of vision, hemorrhage, cataract, glaucoma, retinal tears or detachment. A written consent is on file, and the need for today's injection was discussed and the patient is understanding and wishes to proceed. A 30G needle was placed on an Eylea 2mg/0.05ml Pre-filled Syringe. Proparacaine, Betadine, Akten with additional Betadine prep was performed. A lid speculum was used. An intravitreal injection of Eylea was given. Injection site: 3-4 mm from the limbus. The used syringe/needle was transferred to a biohazard container. Lid speculum removed. Mask worn during procedure. Patient tolerated procedure well. Count fingers vision was verified. There were no complications. Patient was given the standard instruction sheet. Kailyn Cartwright

## 2022-12-13 NOTE — PATIENT DISCUSSION
An examination that was significantly and separately identifiable from the procedure performed today was also completed for HAYDEE.

## 2022-12-13 NOTE — PROCEDURE NOTE: CLINICAL
PROCEDURE NOTE: Eylea Prefilled Syringe 2mg OS. Diagnosis: Diabetic Macular Edema. Prep: Betadine Drops and Betadine Scrub. Prior to injection, risks/benefits/alternatives discussed including corneal abrasion, infection, loss of vision, hemorrhage, cataract, glaucoma, retinal tears or detachment. A written consent is on file, and the need for today's injection was discussed and the patient is understanding and wishes to proceed. A 30G needle was placed on an Eylea 2mg/0.05ml Pre-filled Syringe. Betadine prep was performed. Topical anesthesia was induced with Alcaine. 4% lidocaine pledge. A lid speculum was used. An intravitreal injection of Eylea was given. Injection site: 3-4 mm from the limbus. The used syringe/needle was transferred to a biohazard container. Lid speculum removed. Mask worn during procedure. Patient tolerated procedure well. Count fingers vision was verified. There were no complications. Patient was given the standard instruction sheet. Eric Padilla

## 2023-01-05 ENCOUNTER — COMPREHENSIVE EXAM (OUTPATIENT)
Dept: URBAN - METROPOLITAN AREA CLINIC 35 | Facility: CLINIC | Age: 78
End: 2023-01-05

## 2023-01-05 DIAGNOSIS — H04.123: ICD-10-CM

## 2023-01-05 DIAGNOSIS — H26.493: ICD-10-CM

## 2023-01-05 DIAGNOSIS — H01.026: ICD-10-CM

## 2023-01-05 DIAGNOSIS — H43.813: ICD-10-CM

## 2023-01-05 DIAGNOSIS — H01.023: ICD-10-CM

## 2023-01-05 PROCEDURE — 92014 COMPRE OPH EXAM EST PT 1/>: CPT

## 2023-01-05 ASSESSMENT — VISUAL ACUITY
OD_CC: J3
OS_SC: 20/20-1
OS_CC: J3
OU_SC: 20/25
OU_CC: J2

## 2023-01-05 ASSESSMENT — TONOMETRY
OD_IOP_MMHG: 14
OS_IOP_MMHG: 14

## 2023-01-31 NOTE — PATIENT DISCUSSION
An examination that was significantly and separately identifiable from the procedure performed today was also completed for POAG OU.

## 2023-01-31 NOTE — PATIENT DISCUSSION
Active, Continue with CHIQUITA.  Discussed with the patient the importance of good control of their blood sugar, blood pressure, cholesterol, diet, exercise, weight, and medication usage under the guidance of their diabetic doctor to prevent/halt diabetic retinopathy.

## 2023-01-31 NOTE — PROCEDURE NOTE: CLINICAL
PROCEDURE NOTE: Eylea Prefilled Syringe 2mg OS. Diagnosis: Diabetic Macular Edema. Prep: Betadine Drops and Betadine Scrub. The patient was identified by the physician. The risks, benefits, alternatives, and possible complications of the procedure were discussed with the patient and informed consent was obtained. The procedure eye was marked with a sticker. The patient was positioned in the chair. A sterile small gauge needle on the medication syringe entered the vitreous cavity 3.0-3.5mm from the limbus and the medication was administered without complication. The speculum was removed. The eye was irrigated with sterile eye rinse solution. The patient was instructed to call immediately for any visual loss, swelling, discharge, or pain after the intravitreal injection. Patient tolerated the procedure well. Vision was checked. Post procedure instructions given. Desiree Deleon

## 2023-03-14 NOTE — PATIENT DISCUSSION
AMD remains persistent but without progression. Continue with Amsler grid and AREDS 2. Avoid direct or indirect smoking. Documentation clarification is required for accuracy in coding and billing, claim validation and reporting severity of illness, quality data and risk of mortality.

## 2024-01-11 ENCOUNTER — COMPREHENSIVE EXAM (OUTPATIENT)
Dept: URBAN - METROPOLITAN AREA CLINIC 35 | Facility: CLINIC | Age: 79
End: 2024-01-11

## 2024-01-11 DIAGNOSIS — H04.123: ICD-10-CM

## 2024-01-11 DIAGNOSIS — H43.813: ICD-10-CM

## 2024-01-11 DIAGNOSIS — H52.7: ICD-10-CM

## 2024-01-11 PROCEDURE — 92014 COMPRE OPH EXAM EST PT 1/>: CPT

## 2024-01-11 PROCEDURE — 92015 DETERMINE REFRACTIVE STATE: CPT

## 2024-01-11 PROCEDURE — 92134 CPTRZ OPH DX IMG PST SGM RTA: CPT

## 2024-01-11 ASSESSMENT — VISUAL ACUITY
OD_SC: 20/25-2
OU_CC: J2
OS_SC: 20/25-1
OS_CC: J3
OU_SC: 20/25-1
OD_CC: J3-

## 2024-01-11 ASSESSMENT — TONOMETRY
OD_IOP_MMHG: 15
OS_IOP_MMHG: 14

## 2025-01-11 NOTE — PATIENT DISCUSSION
AMD remains persistent but without progression. Continue with Amsler grid and AREDS 2. Avoid direct or indirect smoking. No

## 2025-01-15 ENCOUNTER — COMPREHENSIVE EXAM (OUTPATIENT)
Age: 80
End: 2025-01-15

## 2025-01-15 DIAGNOSIS — H52.7: ICD-10-CM

## 2025-01-15 DIAGNOSIS — H04.123: ICD-10-CM

## 2025-01-15 DIAGNOSIS — H43.813: ICD-10-CM

## 2025-01-15 DIAGNOSIS — Z98.890: ICD-10-CM

## 2025-01-15 PROCEDURE — 92015 DETERMINE REFRACTIVE STATE: CPT

## 2025-01-15 PROCEDURE — 92014 COMPRE OPH EXAM EST PT 1/>: CPT
